# Patient Record
Sex: FEMALE | Race: BLACK OR AFRICAN AMERICAN | NOT HISPANIC OR LATINO | Employment: FULL TIME | URBAN - METROPOLITAN AREA
[De-identification: names, ages, dates, MRNs, and addresses within clinical notes are randomized per-mention and may not be internally consistent; named-entity substitution may affect disease eponyms.]

---

## 2018-09-28 ENCOUNTER — HISTORICAL (OUTPATIENT)
Dept: ADMINISTRATIVE | Facility: HOSPITAL | Age: 58
End: 2018-09-28

## 2018-09-28 LAB
ABS NEUT (OLG): 4
ALBUMIN SERPL-MCNC: 4.4 GM/DL (ref 3.4–5)
ALBUMIN/GLOB SERPL: 1.57 {RATIO} (ref 1.5–2.5)
ALP SERPL-CCNC: 46 UNIT/L (ref 38–126)
ALT SERPL-CCNC: 23 UNIT/L (ref 7–52)
AST SERPL-CCNC: 22 UNIT/L (ref 15–37)
BILIRUB SERPL-MCNC: 0.5 MG/DL (ref 0.2–1)
BILIRUBIN DIRECT+TOT PNL SERPL-MCNC: 0.1 MG/DL (ref 0–0.5)
BILIRUBIN DIRECT+TOT PNL SERPL-MCNC: 0.4 MG/DL
BUN SERPL-MCNC: 12 MG/DL (ref 7–18)
CALCIUM SERPL-MCNC: 9 MG/DL (ref 8.5–10)
CHLORIDE SERPL-SCNC: 106 MMOL/L (ref 98–107)
CHOLEST SERPL-MCNC: 269 MG/DL (ref 0–200)
CHOLEST/HDLC SERPL: 4.4 {RATIO}
CO2 SERPL-SCNC: 25 MMOL/L (ref 21–32)
CREAT SERPL-MCNC: 0.68 MG/DL (ref 0.6–1.3)
ERYTHROCYTE [DISTWIDTH] IN BLOOD BY AUTOMATED COUNT: 14.1 % (ref 11.5–17)
GLOBULIN SER-MCNC: 2.8 GM/DL (ref 1.2–3)
GLUCOSE SERPL-MCNC: 99 MG/DL (ref 74–106)
HCT VFR BLD AUTO: 41.9 % (ref 37–47)
HDLC SERPL-MCNC: 61 MG/DL (ref 35–60)
HGB BLD-MCNC: 13.5 GM/DL (ref 12–16)
LDLC SERPL CALC-MCNC: 177 MG/DL (ref 0–129)
LYMPHOCYTES # BLD AUTO: 1.1 X10(3)/MCL (ref 0.6–3.4)
LYMPHOCYTES NFR BLD AUTO: 20.5 % (ref 13–40)
MCH RBC QN AUTO: 32.8 PG (ref 27–31.2)
MCHC RBC AUTO-ENTMCNC: 32 GM/DL (ref 32–36)
MCV RBC AUTO: 102 FL (ref 80–94)
MONOCYTES # BLD AUTO: 0.4 X10(3)/MCL (ref 0–1.8)
MONOCYTES NFR BLD AUTO: 7.7 % (ref 0.1–24)
NEUTROPHILS NFR BLD AUTO: 71.8 % (ref 47–80)
PLATELET # BLD AUTO: 673 X10(3)/MCL (ref 130–400)
PMV BLD AUTO: 9 FL
POTASSIUM SERPL-SCNC: 4.1 MMOL/L (ref 3.5–5.1)
PROT SERPL-MCNC: 7.2 GM/DL (ref 6.4–8.2)
RBC # BLD AUTO: 4.12 X10(6)/MCL (ref 4.2–5.4)
SODIUM SERPL-SCNC: 143 MMOL/L (ref 136–145)
TRIGL SERPL-MCNC: 88 MG/DL (ref 30–150)
TSH SERPL-ACNC: 0.87 MIU/ML (ref 0.35–4.94)
VLDLC SERPL CALC-MCNC: 17.6 MG/DL
WBC # SPEC AUTO: 5.5 X10(3)/MCL (ref 4.5–11.5)

## 2019-10-18 ENCOUNTER — HISTORICAL (OUTPATIENT)
Dept: ADMINISTRATIVE | Facility: HOSPITAL | Age: 59
End: 2019-10-18

## 2019-10-18 LAB
ABS NEUT (OLG): 3.5 X10(3)/MCL (ref 2.1–9.2)
ALBUMIN SERPL-MCNC: 4.3 GM/DL (ref 3.4–5)
ALBUMIN/GLOB SERPL: 1.54 {RATIO} (ref 1.5–2.5)
ALP SERPL-CCNC: 47 UNIT/L (ref 38–126)
ALT SERPL-CCNC: 15 UNIT/L (ref 7–52)
AST SERPL-CCNC: 18 UNIT/L (ref 15–37)
BILIRUB SERPL-MCNC: 0.4 MG/DL (ref 0.2–1)
BILIRUBIN DIRECT+TOT PNL SERPL-MCNC: 0.1 MG/DL (ref 0–0.5)
BILIRUBIN DIRECT+TOT PNL SERPL-MCNC: 0.3 MG/DL
BUN SERPL-MCNC: 10 MG/DL (ref 7–18)
CALCIUM SERPL-MCNC: 9.4 MG/DL (ref 8.5–10)
CHLORIDE SERPL-SCNC: 106 MMOL/L (ref 98–107)
CHOLEST SERPL-MCNC: 260 MG/DL (ref 0–200)
CHOLEST/HDLC SERPL: 4.6 {RATIO}
CO2 SERPL-SCNC: 26 MMOL/L (ref 21–32)
CREAT SERPL-MCNC: 0.68 MG/DL (ref 0.6–1.3)
ERYTHROCYTE [DISTWIDTH] IN BLOOD BY AUTOMATED COUNT: 14 % (ref 11.5–17)
GLOBULIN SER-MCNC: 2.8 GM/DL (ref 1.2–3)
GLUCOSE SERPL-MCNC: 101 MG/DL (ref 74–106)
HCT VFR BLD AUTO: 39.4 % (ref 37–47)
HDLC SERPL-MCNC: 57 MG/DL (ref 35–60)
HGB BLD-MCNC: 13.4 GM/DL (ref 12–16)
LDLC SERPL CALC-MCNC: 166 MG/DL (ref 0–129)
LYMPHOCYTES # BLD AUTO: 1.3 X10(3)/MCL (ref 0.6–3.4)
LYMPHOCYTES NFR BLD AUTO: 24.4 % (ref 13–40)
MCH RBC QN AUTO: 32.5 PG (ref 27–31.2)
MCHC RBC AUTO-ENTMCNC: 34 GM/DL (ref 32–36)
MCV RBC AUTO: 96 FL (ref 80–94)
MONOCYTES # BLD AUTO: 0.5 X10(3)/MCL (ref 0.1–1.3)
MONOCYTES NFR BLD AUTO: 9.6 % (ref 0.1–24)
NEUTROPHILS NFR BLD AUTO: 66 % (ref 47–80)
PLATELET # BLD AUTO: 691 X10(3)/MCL (ref 130–400)
PMV BLD AUTO: 9.3 FL (ref 9.4–12.4)
POTASSIUM SERPL-SCNC: 4.2 MMOL/L (ref 3.5–5.1)
PROT SERPL-MCNC: 7.1 GM/DL (ref 6.4–8.2)
RBC # BLD AUTO: 4.12 X10(6)/MCL (ref 4.2–5.4)
SODIUM SERPL-SCNC: 141 MMOL/L (ref 136–145)
TRIGL SERPL-MCNC: 87 MG/DL (ref 30–150)
TSH SERPL-ACNC: 0.5 MIU/ML (ref 0.35–4.94)
VLDLC SERPL CALC-MCNC: 17.4 MG/DL
WBC # SPEC AUTO: 5.3 X10(3)/MCL (ref 4.5–11.5)

## 2020-05-07 ENCOUNTER — HISTORICAL (OUTPATIENT)
Dept: ADMINISTRATIVE | Facility: HOSPITAL | Age: 60
End: 2020-05-07

## 2020-05-07 LAB
ABS NEUT (OLG): 3.3 X10(3)/MCL (ref 2.1–9.2)
ALBUMIN SERPL-MCNC: 4.1 GM/DL (ref 3.4–5)
ALBUMIN/GLOB SERPL: 1.58 {RATIO} (ref 1.5–2.5)
ALP SERPL-CCNC: 54 UNIT/L (ref 38–126)
ALT SERPL-CCNC: 38 UNIT/L (ref 7–52)
APPEARANCE, UA: CLEAR
AST SERPL-CCNC: 33 UNIT/L (ref 15–37)
BACTERIA #/AREA URNS AUTO: NORMAL /HPF
BILIRUB SERPL-MCNC: 0.3 MG/DL (ref 0.2–1)
BILIRUB UR QL STRIP: NEGATIVE MG/DL
BILIRUBIN DIRECT+TOT PNL SERPL-MCNC: 0 MG/DL (ref 0–0.5)
BILIRUBIN DIRECT+TOT PNL SERPL-MCNC: 0.3 MG/DL
BUN SERPL-MCNC: 16 MG/DL (ref 7–18)
CALCIUM SERPL-MCNC: 9.3 MG/DL (ref 8.5–10)
CHLORIDE SERPL-SCNC: 107 MMOL/L (ref 98–107)
CHOLEST SERPL-MCNC: 249 MG/DL (ref 0–200)
CHOLEST/HDLC SERPL: 4.4 {RATIO}
CO2 SERPL-SCNC: 25 MMOL/L (ref 21–32)
COLOR UR: YELLOW
CREAT SERPL-MCNC: 0.71 MG/DL (ref 0.6–1.3)
ERYTHROCYTE [DISTWIDTH] IN BLOOD BY AUTOMATED COUNT: 14.5 % (ref 11.5–17)
GLOBULIN SER-MCNC: 2.6 GM/DL (ref 1.2–3)
GLUCOSE (UA): NEGATIVE MG/DL
GLUCOSE SERPL-MCNC: 94 MG/DL (ref 74–106)
HCT VFR BLD AUTO: 40.5 % (ref 37–47)
HDLC SERPL-MCNC: 56 MG/DL (ref 35–60)
HGB BLD-MCNC: 13.3 GM/DL (ref 12–16)
HGB UR QL STRIP: NEGATIVE UNIT/L
KETONES UR QL STRIP: NEGATIVE MG/DL
LDLC SERPL CALC-MCNC: 171 MG/DL (ref 0–129)
LEUKOCYTE ESTERASE UR QL STRIP: NEGATIVE UNIT/L
LYMPHOCYTES # BLD AUTO: 1.4 X10(3)/MCL (ref 0.6–3.4)
LYMPHOCYTES NFR BLD AUTO: 26.7 % (ref 13–40)
MCH RBC QN AUTO: 31.5 PG (ref 27–31.2)
MCHC RBC AUTO-ENTMCNC: 33 GM/DL (ref 32–36)
MCV RBC AUTO: 96 FL (ref 80–94)
MONOCYTES # BLD AUTO: 0.6 X10(3)/MCL (ref 0.1–1.3)
MONOCYTES NFR BLD AUTO: 10.7 % (ref 0.1–24)
NEUTROPHILS NFR BLD AUTO: 62.6 % (ref 47–80)
NITRITE UR QL STRIP.AUTO: NEGATIVE
PH UR STRIP: 6 [PH]
PLATELET # BLD AUTO: 804 X10(3)/MCL (ref 130–400)
PMV BLD AUTO: 9.3 FL (ref 9.4–12.4)
POTASSIUM SERPL-SCNC: 4.1 MMOL/L (ref 3.5–5.1)
PROT SERPL-MCNC: 6.7 GM/DL (ref 6.4–8.2)
PROT UR QL STRIP: NEGATIVE MG/DL
RBC # BLD AUTO: 4.22 X10(6)/MCL (ref 4.2–5.4)
RBC #/AREA URNS HPF: NORMAL /HPF
SODIUM SERPL-SCNC: 142 MMOL/L (ref 136–145)
SP GR UR STRIP: 1.02
SQUAMOUS EPITHELIAL, UA: NORMAL /LPF
TRIGL SERPL-MCNC: 99 MG/DL (ref 30–150)
UROBILINOGEN UR STRIP-ACNC: 0.2 MG/DL
VLDLC SERPL CALC-MCNC: 19.8 MG/DL
WBC # SPEC AUTO: 5.3 X10(3)/MCL (ref 4.5–11.5)
WBC #/AREA URNS AUTO: NORMAL /[HPF]

## 2020-12-11 ENCOUNTER — HISTORICAL (OUTPATIENT)
Dept: ADMINISTRATIVE | Facility: HOSPITAL | Age: 60
End: 2020-12-11

## 2020-12-11 LAB
ABS NEUT (OLG): 3.6 X10(3)/MCL (ref 2.1–9.2)
ALBUMIN SERPL-MCNC: 4.4 GM/DL (ref 3.4–5)
ALBUMIN/GLOB SERPL: 1.83 {RATIO} (ref 1.5–2.5)
ALP SERPL-CCNC: 58 UNIT/L (ref 38–126)
ALT SERPL-CCNC: 29 UNIT/L (ref 7–52)
AST SERPL-CCNC: 24 UNIT/L (ref 15–37)
BILIRUB SERPL-MCNC: 0.5 MG/DL (ref 0.2–1)
BILIRUBIN DIRECT+TOT PNL SERPL-MCNC: 0.1 MG/DL (ref 0–0.5)
BILIRUBIN DIRECT+TOT PNL SERPL-MCNC: 0.4 MG/DL
BUN SERPL-MCNC: 10 MG/DL (ref 7–18)
CALCIUM SERPL-MCNC: 9.6 MG/DL (ref 8.5–10)
CHLORIDE SERPL-SCNC: 106 MMOL/L (ref 98–107)
CHOLEST SERPL-MCNC: 173 MG/DL (ref 0–200)
CHOLEST/HDLC SERPL: 3.2 {RATIO}
CO2 SERPL-SCNC: 30 MMOL/L (ref 21–32)
CREAT SERPL-MCNC: 0.61 MG/DL (ref 0.6–1.3)
ERYTHROCYTE [DISTWIDTH] IN BLOOD BY AUTOMATED COUNT: 14.1 % (ref 11.5–17)
GLOBULIN SER-MCNC: 2.4 GM/DL (ref 1.2–3)
GLUCOSE SERPL-MCNC: 92 MG/DL (ref 74–106)
HCT VFR BLD AUTO: 41.3 % (ref 37–47)
HDLC SERPL-MCNC: 54 MG/DL (ref 35–60)
HGB BLD-MCNC: 13.6 GM/DL (ref 12–16)
LDLC SERPL CALC-MCNC: 108 MG/DL (ref 0–129)
LYMPHOCYTES # BLD AUTO: 1.4 X10(3)/MCL (ref 0.6–3.4)
LYMPHOCYTES NFR BLD AUTO: 26.6 % (ref 13–40)
MCH RBC QN AUTO: 31.9 PG (ref 27–31.2)
MCHC RBC AUTO-ENTMCNC: 33 GM/DL (ref 32–36)
MCV RBC AUTO: 97 FL (ref 80–94)
MONOCYTES # BLD AUTO: 0.4 X10(3)/MCL (ref 0.1–1.3)
MONOCYTES NFR BLD AUTO: 7.7 % (ref 0.1–24)
NEUTROPHILS NFR BLD AUTO: 65.7 % (ref 47–80)
PLATELET # BLD AUTO: 819 X10(3)/MCL (ref 130–400)
PMV BLD AUTO: 9.5 FL (ref 9.4–12.4)
POTASSIUM SERPL-SCNC: 4 MMOL/L (ref 3.5–5.1)
PROT SERPL-MCNC: 6.8 GM/DL (ref 6.4–8.2)
RBC # BLD AUTO: 4.26 X10(6)/MCL (ref 4.2–5.4)
SODIUM SERPL-SCNC: 143 MMOL/L (ref 136–145)
TRIGL SERPL-MCNC: 64 MG/DL (ref 30–150)
TSH SERPL-ACNC: 1.02 MIU/ML (ref 0.35–4.94)
VLDLC SERPL CALC-MCNC: 12.8 MG/DL
WBC # SPEC AUTO: 5.4 X10(3)/MCL (ref 4.5–11.5)

## 2021-09-15 ENCOUNTER — HISTORICAL (OUTPATIENT)
Dept: ADMINISTRATIVE | Facility: HOSPITAL | Age: 61
End: 2021-09-15

## 2021-09-15 LAB
ABS NEUT (OLG): 3.7 X10(3)/MCL (ref 2.1–9.2)
ALBUMIN SERPL-MCNC: 4.1 GM/DL (ref 3.4–5)
ALBUMIN/GLOB SERPL: 1.78 {RATIO} (ref 1.5–2.5)
ALP SERPL-CCNC: 50 UNIT/L (ref 38–126)
ALT SERPL-CCNC: 34 UNIT/L (ref 7–52)
APPEARANCE, UA: ABNORMAL
AST SERPL-CCNC: 26 UNIT/L (ref 15–37)
BACTERIA #/AREA URNS AUTO: ABNORMAL /HPF
BILIRUB SERPL-MCNC: 0.5 MG/DL (ref 0.2–1)
BILIRUB UR QL STRIP: NEGATIVE MG/DL
BILIRUBIN DIRECT+TOT PNL SERPL-MCNC: 0.1 MG/DL (ref 0–0.5)
BILIRUBIN DIRECT+TOT PNL SERPL-MCNC: 0.4 MG/DL
BUN SERPL-MCNC: 11 MG/DL (ref 7–18)
CALCIUM SERPL-MCNC: 9.1 MG/DL (ref 8.5–10)
CHLORIDE SERPL-SCNC: 106 MMOL/L (ref 98–107)
CHOLEST SERPL-MCNC: 195 MG/DL (ref 0–200)
CHOLEST/HDLC SERPL: 3.5 {RATIO}
CO2 SERPL-SCNC: 29 MMOL/L (ref 21–32)
COLOR UR: YELLOW
CREAT SERPL-MCNC: 0.66 MG/DL (ref 0.6–1.3)
ERYTHROCYTE [DISTWIDTH] IN BLOOD BY AUTOMATED COUNT: 13.9 % (ref 11.5–17)
GLOBULIN SER-MCNC: 2.3 GM/DL (ref 1.2–3)
GLUCOSE (UA): NEGATIVE MG/DL
GLUCOSE SERPL-MCNC: 88 MG/DL (ref 74–106)
HCT VFR BLD AUTO: 40.1 % (ref 37–47)
HDLC SERPL-MCNC: 55 MG/DL (ref 35–60)
HGB BLD-MCNC: 13.1 GM/DL (ref 12–16)
HGB UR QL STRIP: NEGATIVE UNIT/L
KETONES UR QL STRIP: NEGATIVE MG/DL
LDLC SERPL CALC-MCNC: 111 MG/DL (ref 0–129)
LEUKOCYTE ESTERASE UR QL STRIP: ABNORMAL UNIT/L
LYMPHOCYTES # BLD AUTO: 1.4 X10(3)/MCL (ref 0.6–3.4)
LYMPHOCYTES NFR BLD AUTO: 25.4 % (ref 13–40)
MCH RBC QN AUTO: 32 PG (ref 27–31.2)
MCHC RBC AUTO-ENTMCNC: 33 GM/DL (ref 32–36)
MCV RBC AUTO: 98 FL (ref 80–94)
MONOCYTES # BLD AUTO: 0.5 X10(3)/MCL (ref 0.1–1.3)
MONOCYTES NFR BLD AUTO: 8.7 % (ref 0.1–24)
NEUTROPHILS NFR BLD AUTO: 65.9 % (ref 47–80)
NITRITE UR QL STRIP.AUTO: NEGATIVE
PH UR STRIP: 7 [PH]
PLATELET # BLD AUTO: 732 X10(3)/MCL (ref 130–400)
PMV BLD AUTO: 9.1 FL (ref 9.4–12.4)
POTASSIUM SERPL-SCNC: 3.7 MMOL/L (ref 3.5–5.1)
PROT SERPL-MCNC: 6.4 GM/DL (ref 6.4–8.2)
PROT UR QL STRIP: NEGATIVE MG/DL
RBC # BLD AUTO: 4.09 X10(6)/MCL (ref 4.2–5.4)
RBC #/AREA URNS HPF: ABNORMAL /HPF
SODIUM SERPL-SCNC: 143 MMOL/L (ref 136–145)
SP GR UR STRIP: 1.02
SQUAMOUS EPITHELIAL, UA: ABNORMAL /LPF
TRIGL SERPL-MCNC: 83 MG/DL (ref 30–150)
UROBILINOGEN UR STRIP-ACNC: 0.2 MG/DL
VLDLC SERPL CALC-MCNC: 16.6 MG/DL
WBC # SPEC AUTO: 5.6 X10(3)/MCL (ref 4.5–11.5)
WBC #/AREA URNS AUTO: ABNORMAL /[HPF]

## 2021-09-17 LAB — FINAL CULTURE: NORMAL

## 2022-04-11 ENCOUNTER — HISTORICAL (OUTPATIENT)
Dept: ADMINISTRATIVE | Facility: HOSPITAL | Age: 62
End: 2022-04-11

## 2022-04-27 VITALS
BODY MASS INDEX: 27.47 KG/M2 | DIASTOLIC BLOOD PRESSURE: 82 MMHG | HEIGHT: 71 IN | SYSTOLIC BLOOD PRESSURE: 128 MMHG | WEIGHT: 196.19 LBS

## 2022-09-23 PROBLEM — D47.3 ESSENTIAL THROMBOCYTHEMIA: Status: RESOLVED | Noted: 2022-09-23 | Resolved: 2022-09-23

## 2022-09-23 PROBLEM — F41.1 GENERALIZED ANXIETY DISORDER: Status: ACTIVE | Noted: 2022-09-23

## 2022-09-23 PROBLEM — E78.5 HYPERLIPIDEMIA: Status: ACTIVE | Noted: 2022-09-23

## 2022-09-23 PROBLEM — D47.3 ESSENTIAL THROMBOCYTHEMIA: Status: ACTIVE | Noted: 2022-09-23

## 2022-09-23 PROBLEM — I10 BENIGN ESSENTIAL HYPERTENSION: Status: ACTIVE | Noted: 2022-09-23

## 2022-09-23 PROBLEM — D75.839 THROMBOCYTOSIS: Status: ACTIVE | Noted: 2022-09-23

## 2022-12-02 ENCOUNTER — HOSPITAL ENCOUNTER (OUTPATIENT)
Dept: RADIOLOGY | Facility: HOSPITAL | Age: 62
Discharge: HOME | End: 2022-12-02
Attending: NURSE PRACTITIONER
Payer: COMMERCIAL

## 2022-12-02 DIAGNOSIS — Z12.2 ENCOUNTER FOR SCREENING FOR LUNG CANCER: ICD-10-CM

## 2022-12-02 DIAGNOSIS — Z72.0 TOBACCO USE: ICD-10-CM

## 2022-12-02 PROCEDURE — 71271 CT THORAX LUNG CANCER SCR C-: CPT | Mod: TC

## 2023-07-27 PROBLEM — Z00.00 WELLNESS EXAMINATION: Status: ACTIVE | Noted: 2023-07-27

## 2023-10-30 PROBLEM — Z00.00 WELLNESS EXAMINATION: Status: RESOLVED | Noted: 2023-07-27 | Resolved: 2023-10-30

## 2024-06-04 ENCOUNTER — TELEPHONE (OUTPATIENT)
Dept: NEUROSURGERY | Facility: CLINIC | Age: 64
End: 2024-06-04
Payer: MEDICAID

## 2024-06-04 NOTE — TELEPHONE ENCOUNTER
PATIENT HAS BEEN CALLED APPOINTMENT HAS BEEN SCHEDULED WITH DR. TRISTEN ALMAGUER. PATIENT WOULD LIKE TO BE SEEN CLOSER TO [place] IF POSSIBLE. PATIENT WAS ADVISED THAT THERE ARE NO NEUROSURGERY IN THE [facility] IN [facility]. PATIENT VERBALIZED CLEAR UNDERSTANDING, AND WILL KEEP HER APPOINTMENT AT [facility]. PATIENT WAS ADVISED TO GET ALL HER MRI'S ON A DISC TO BRING TO HER SCHEDULED APPOINTMENT.

## 2024-06-04 NOTE — TELEPHONE ENCOUNTER
----- Message from Delfina Thakkar sent at 6/3/2024  9:44 AM CDT -----  Regarding: appt access  Contact: [phone]  Pt is calling to speak with someone in provider office in regards to scheduling an appt with a provider in this dept pt has a referral in [facility] pt stated she has  a brain tumor pt  is asking for a return call please call pt at  [phone]

## 2024-06-06 DIAGNOSIS — R90.0 INTRACRANIAL MASS: Primary | ICD-10-CM

## 2024-06-07 ENCOUNTER — TELEPHONE (OUTPATIENT)
Dept: NEUROSURGERY | Facility: CLINIC | Age: 64
End: 2024-06-07
Payer: MEDICAID

## 2024-06-07 NOTE — TELEPHONE ENCOUNTER
64 year old female patient is being referred to Dr Gutierrze by Norma Arce NP at [facility] on 6/6/24 for an intercranial mass after Telehealth f/u visit on 4/2/24 for sinus symptoms for 3/4 days     Telephone Note in chart 6/4/24 states:   PATIENT HAS BEEN CALLED APPOINTMENT HAS BEEN SCHEDULED WITH DR. TRISTEN ALMAGUER. PATIENT WOULD LIKE TO BE SEEN CLOSER TO [place] IF POSSIBLE. PATIENT WAS ADVISED THAT THERE ARE NO NEUROSURGERY IN THE [facility] IN [facility]. PATIENT VERBALIZED CLEAR UNDERSTANDING, AND WILL KEEP HER APPOINTMENT AT [facility]. PATIENT WAS ADVISED TO GET ALL HER MRI'S ON A DISC TO BRING TO HER SCHEDULED APPOINTMENT.     **Appointment is scheduled for 6/18/24       MRI Brain 5/29/24 (report in chart - requested images from [facility] 6/7) findings state:   Mass Lesion / Mass Effect:  1.8 x 2.5 cm right anterior temporal fossa meningioma is again present.     Similar right temporal, insular and basal ganglia confluent increased FLAIR signal compatible with vasogenic edema. Similar mild mass effect upon the right lateral ventricle with 2 mm of left midline shift. No basilar cistern effacement. The mass abuts the MCA M1 and M2 bifurcation. No additional lesions are present.     Cortex/White matter: Scattered foci of FLAIR/T2 signal within the subcortical and periventricular white matter is nonspecific, but likely related to mild chronic small vessel ischemic disease. Similar periatrial subcortical white matter gliosis.     *COMPARISON: CT 5/29/2024. MR brain 5/8/2024. MR brain 4/11/2024. CT 4/11/2024 - reports in chart - requested images from [facility] 6/7    [facility] Neurology consult note from ED 5/29/24 states:   Neurosurgery is recommended 7 days of Decadron.  They have given the ER the dose.     I have suggested continuing the Keppra 500 mg twice a day.  There is nothing that I get historically to suggest a seizure.  What she is describing is anxiety.  I do not  believe that these are auras of temporal lobe seizure although I cannot be certain.  Routine EEG was unremarkable although the official report is pending.  I have suggested outpatient neurology.  I am not at all certain that the original spell was a seizure.  It is very difficult to tell from the old chart but while I see is a fall leading to hospitalization after imaging showed a mass.  Keppra was only started for seizure prophylaxis.  Based on what I am hearing I cannot justify increasing the Keppra and when I did suggest that she was not in favor of it.  When I did discuss the potential need for an alternative anticonvulsant with a different side effect profile she was reluctant to have that discussion.  But again many would not put patients on anticonvulsants prophylactically in the situation instead waiting to see if they do indeed have seizure activity.  That is not my approach.  I fully support the plans and management of my colleagues that have taken place.  I am not at all clear that she has had any emotional or psychiatric complications of Keppra but reviewed them at length and urged her to return to the ER immediately if she thought what was the case.  Urged to return immediately if she had any seizure activity and she is quite clear that she has not had a yanna ictal event with loss of contact with her surroundings or a clear suggestion of focal neurologic dysfunction as aura.     I have spoken with nursing at length about making sure she has outpatient neurology and neurosurgery follow-ups.     I have given the patient my cell phone number in case things are falling through the cracks.     Neurosurgery saw her and spoke to me at length and they do not feel this is an emergent issue that justifies hospitalization.  Again the patient's never had a seizure that I can be certain of.  Keppra was started as seizure prophylaxis.     Hopefully outpatient follow-ups will occur.     Again I given the patient my  cell phone number in case things do not follow through.      **I believe Magdalena may have spoken to you re this referral yesterday   Please review and advise as to how to proceed. Thank you

## 2024-06-07 NOTE — TELEPHONE ENCOUNTER
See should be seen soon.  Try to work in next week with either BARBARA, Matt, or Shamar.   Bilateral Helical Rim Advancement Flap Text: The defect edges were debeveled with a #15 blade scalpel.  Given the location of the defect and the proximity to free margins (helical rim) a bilateral helical rim advancement flap was deemed most appropriate.  Using a sterile surgical marker, the appropriate advancement flaps were drawn incorporating the defect and placing the expected incisions between the helical rim and antihelix where possible.  The area thus outlined was incised through and through with a #15 scalpel blade.  With a skin hook and iris scissors, the flaps were gently and sharply undermined and freed up.

## 2024-06-11 ENCOUNTER — TELEPHONE (OUTPATIENT)
Dept: NEUROSURGERY | Facility: CLINIC | Age: 64
End: 2024-06-11
Payer: MEDICAID

## 2024-06-11 NOTE — TELEPHONE ENCOUNTER
Attempted to call back pt with both numbers on file 2x. No answer so VM left with callback number if still having questions.   ----- Message from Haleigh Stewart sent at 6/11/2024 10:31 AM CDT -----  Regarding: Pt Advice  Contact: [phone]  Pt calling to speak with someone in provider office regarding appt on 6/18. States she would like to know will she be scheduled for surgery on 6/18. Please call pt back at  [phone]

## 2024-06-18 ENCOUNTER — OFFICE VISIT (OUTPATIENT)
Dept: NEUROSURGERY | Facility: CLINIC | Age: 64
End: 2024-06-18
Payer: MEDICAID

## 2024-06-18 VITALS — HEART RATE: 108 BPM | DIASTOLIC BLOOD PRESSURE: 99 MMHG | SYSTOLIC BLOOD PRESSURE: 137 MMHG

## 2024-06-18 DIAGNOSIS — D32.9 MENINGIOMA: Primary | ICD-10-CM

## 2024-06-18 PROCEDURE — 3075F SYST BP GE 130 - 139MM HG: CPT | Mod: CPTII,,, | Performed by: NEUROLOGICAL SURGERY

## 2024-06-18 PROCEDURE — 1159F MED LIST DOCD IN RCRD: CPT | Mod: CPTII,,, | Performed by: NEUROLOGICAL SURGERY

## 2024-06-18 PROCEDURE — 99205 OFFICE O/P NEW HI 60 MIN: CPT | Mod: S$PBB,,, | Performed by: NEUROLOGICAL SURGERY

## 2024-06-18 PROCEDURE — 99999 PR PBB SHADOW E&M-EST. PATIENT-LVL IV: CPT | Mod: PBBFAC,,, | Performed by: NEUROLOGICAL SURGERY

## 2024-06-18 PROCEDURE — 99214 OFFICE O/P EST MOD 30 MIN: CPT | Mod: PBBFAC | Performed by: NEUROLOGICAL SURGERY

## 2024-06-18 PROCEDURE — 3080F DIAST BP >= 90 MM HG: CPT | Mod: CPTII,,, | Performed by: NEUROLOGICAL SURGERY

## 2024-06-18 PROCEDURE — 1160F RVW MEDS BY RX/DR IN RCRD: CPT | Mod: CPTII,,, | Performed by: NEUROLOGICAL SURGERY

## 2024-06-18 RX ORDER — LEVETIRACETAM 500 MG/1
TABLET ORAL
COMMUNITY

## 2024-06-18 RX ORDER — DEXAMETHASONE 2 MG/1
2 TABLET ORAL EVERY 12 HOURS
Qty: 28 TABLET | Refills: 0 | Status: SHIPPED | OUTPATIENT
Start: 2024-06-18 | End: 2024-07-02

## 2024-06-18 NOTE — H&P (VIEW-ONLY)
CHIEF COMPLAINT:  Meningioma    HPI:  Meghana Colón is a 64 y.o.  female with below PMH, who is referred for evaluation of right middle fossa/temporal extradural mass.  MRI was obtained after an episode where she was found on the floor in which she hit her head.  There was a question that this was related to seizure activity.  She was placed on Keppra and takes it as directed.  She is quite anxious about this mass as several of her family members have  due to brain metastases in the past.  She is eager to have surgery to remove it.    She reports right hand numbness, tremors, headaches, weakness on her left side more than her right.  She also reports that her speech is off but thinks it could be due to her anxiety.    She was seen in the emergency department where she was placed on Decadron which she completed on .  She still has headaches on the right sides since stopping the Decadron.      She also has a history of central thrombocytopenia for which he was taking aspirin but has since stopped due to the recent fall.    Review of patient's allergies indicates:   Allergen Reactions    Penicillin      Other reaction(s): Unknown       History reviewed. No pertinent past medical history.  Past Surgical History:   Procedure Laterality Date    BONE MARROW BIOPSY      BREAST BIOPSY      COLONOSCOPY  2016    HYSTERECTOMY      THERAPEUTIC        Family History   Problem Relation Name Age of Onset    Cancer Mother      Alzheimer's disease Mother      Heart disease Mother      Cancer Father      Hypertension Sister      Diabetes Sister      Cancer Sister          breast    Cancer Brother      Hypertension Brother      Heart disease Brother      Heart attack Brother       Social History     Tobacco Use    Smoking status: Some Days     Types: Cigarettes    Smokeless tobacco: Never        Review of Systems   Constitutional: Negative.    HENT:  Negative for congestion, ear discharge, ear pain, hearing  loss, nosebleeds, sinus pain and tinnitus.    Eyes: Negative.    Respiratory: Negative.     Cardiovascular:  Negative for chest pain, palpitations, claudication and leg swelling.   Gastrointestinal:  Negative for abdominal pain, blood in stool, constipation, diarrhea, melena and vomiting.   Genitourinary:  Negative for flank pain, frequency and urgency.   Musculoskeletal:  Negative for falls.   Skin: Negative.    Neurological:  Positive for tremors, sensory change, seizures and loss of consciousness. Negative for dizziness, tingling, speech change, focal weakness, weakness and headaches.   Endo/Heme/Allergies:  Does not bruise/bleed easily.   Psychiatric/Behavioral:  Negative for depression, hallucinations, memory loss, substance abuse and suicidal ideas. The patient is nervous/anxious. The patient does not have insomnia.        OBJECTIVE:   Vital Signs:  Pulse: 108 (06/18/24 1427)  BP: (!) 137/99 (06/18/24 1427)    Physical Exam:  Constitutional: Patient sitting comfortably in chair. Appears well developed and well nourished.  Skin: Exposed areas are intact without abnormal markings, rashes or other lesions.  HEENT: Normocephalic. Normal conjunctivae.  Cardiovascular: Normal rate and regular rhythm.  Respiratory: Chest wall rises and falls symmetrically, without signs of respiratory distress.  Abdomen: Soft and non-tender.  Extremities: Warm and without edema. Calves supple, non-tender.  Psych/Behavior: Normal affect.    Neurological:    Mental status: Alert and oriented. Conversational and appropriate.       Cranial Nerves: VFF to confrontation. PERRL. EOMI without nystagmus. Facial STLT normal and symmetric. Strong, symmetric muscles of mastication. Facial strength full and symmetric. Hearing equal bilaterally to finger rub. Palate and uvula rise and fall normally in midline. Shoulder shrug 5/5 strength. Tongue midline.     Motor:    Upper:  Deltoids Triceps Biceps WE WF     R 5/5 5/5 5/5 5/5 5/5 5/5    L 5/5  5/5 5/5 5/5 5/5 5/5      Lower:  HF KE KF DF PF EHL    R 5/5 5/5 5/5 5/5 5/5 5/5    L 5/5 5/5 5/5 5/5 5/5 5/5     Sensory: Intact sensation to light touch in all extremities. Romberg negative.    Reflexes:          DTR: 2+ symmetrically throughout.     Romero's: Negative.     Babinski's: Negative.     Clonus: Negative.    Cerebellar: Finger-to-nose and rapid alternating movements normal.     Gait stable    Diagnostic Results:  All imaging was independently reviewed by me.    Outside MRI brain, dated 5/29/24:  1. Right lateral sphenoid extradural mass with compression on the left temporal lobe causing vasogenic edema      ASSESSMENT/PLAN:     Problem List Items Addressed This Visit          Oncology    Meningioma - Primary    Relevant Orders    MRI BRAIN STEALTH W/O FIDUCIALS WITH CONTRAST    Case Request Operating Room: CRANIOTOMY, WITH NEOPLASM EXCISION USING COMPUTER-ASSISTED NAVIGATION (Completed)       Patient was found to have a right lateral sphenoid extradural mass most likely a meningioma.  She recently had an episode of falling/loss of consciousness that could be related to seizure activity (although unwitnessed). She is anxious about the tumor and would like to have it removed.  She is currently stable on Keppra and completed a course of Decadron.  I recommend she continue taking the Keppra and will restart her on Decadron 2 mg b.i.d. until surgery.  Given her history of central thrombocytopenia, I would like her evaluated by Dr. Dorsey prior to surgery.    - Surgery scheduled for 7/1/24 at [facility]  - Preop clearance needed from Dr Dorsey  - STOP TAKING ASPIRIN, NSAIDS, AND ALL OTHER BLOOD THINNERS 7 DAYS PRIOR TO SURGERY  - Ordered decadron 2mg BID  - Cont keppra  - Ordered new Brain MRI stealth with contrast (This was necessary because outside MRI scan unable to be properly loaded into our neuronavigation system.  Neuronavigation is essential to safely performing surgery)    The patient understands  and agrees with the plan of care. All questions were answered.            .

## 2024-06-18 NOTE — PROGRESS NOTES
CHIEF COMPLAINT:  Meningioma    HPI:  Meghana Colón is a 64 y.o.  female with below PMH, who is referred for evaluation of right middle fossa/temporal extradural mass.  MRI was obtained after an episode where she was found on the floor in which she hit her head.  There was a question that this was related to seizure activity.  She was placed on Keppra and takes it as directed.  She is quite anxious about this mass as several of her family members have  due to brain metastases in the past.  She is eager to have surgery to remove it.    She reports right hand numbness, tremors, headaches, weakness on her left side more than her right.  She also reports that her speech is off but thinks it could be due to her anxiety.    She was seen in the emergency department where she was placed on Decadron which she completed on .  She still has headaches on the right sides since stopping the Decadron.      She also has a history of central thrombocytopenia for which he was taking aspirin but has since stopped due to the recent fall.    Review of patient's allergies indicates:   Allergen Reactions    Penicillin      Other reaction(s): Unknown       History reviewed. No pertinent past medical history.  Past Surgical History:   Procedure Laterality Date    BONE MARROW BIOPSY      BREAST BIOPSY      COLONOSCOPY  2016    HYSTERECTOMY      THERAPEUTIC        Family History   Problem Relation Name Age of Onset    Cancer Mother      Alzheimer's disease Mother      Heart disease Mother      Cancer Father      Hypertension Sister      Diabetes Sister      Cancer Sister          breast    Cancer Brother      Hypertension Brother      Heart disease Brother      Heart attack Brother       Social History     Tobacco Use    Smoking status: Some Days     Types: Cigarettes    Smokeless tobacco: Never        Review of Systems   Constitutional: Negative.    HENT:  Negative for congestion, ear discharge, ear pain, hearing  loss, nosebleeds, sinus pain and tinnitus.    Eyes: Negative.    Respiratory: Negative.     Cardiovascular:  Negative for chest pain, palpitations, claudication and leg swelling.   Gastrointestinal:  Negative for abdominal pain, blood in stool, constipation, diarrhea, melena and vomiting.   Genitourinary:  Negative for flank pain, frequency and urgency.   Musculoskeletal:  Negative for falls.   Skin: Negative.    Neurological:  Positive for tremors, sensory change, seizures and loss of consciousness. Negative for dizziness, tingling, speech change, focal weakness, weakness and headaches.   Endo/Heme/Allergies:  Does not bruise/bleed easily.   Psychiatric/Behavioral:  Negative for depression, hallucinations, memory loss, substance abuse and suicidal ideas. The patient is nervous/anxious. The patient does not have insomnia.        OBJECTIVE:   Vital Signs:  Pulse: 108 (06/18/24 1427)  BP: (!) 137/99 (06/18/24 1427)    Physical Exam:  Constitutional: Patient sitting comfortably in chair. Appears well developed and well nourished.  Skin: Exposed areas are intact without abnormal markings, rashes or other lesions.  HEENT: Normocephalic. Normal conjunctivae.  Cardiovascular: Normal rate and regular rhythm.  Respiratory: Chest wall rises and falls symmetrically, without signs of respiratory distress.  Abdomen: Soft and non-tender.  Extremities: Warm and without edema. Calves supple, non-tender.  Psych/Behavior: Normal affect.    Neurological:    Mental status: Alert and oriented. Conversational and appropriate.       Cranial Nerves: VFF to confrontation. PERRL. EOMI without nystagmus. Facial STLT normal and symmetric. Strong, symmetric muscles of mastication. Facial strength full and symmetric. Hearing equal bilaterally to finger rub. Palate and uvula rise and fall normally in midline. Shoulder shrug 5/5 strength. Tongue midline.     Motor:    Upper:  Deltoids Triceps Biceps WE WF     R 5/5 5/5 5/5 5/5 5/5 5/5    L 5/5  5/5 5/5 5/5 5/5 5/5      Lower:  HF KE KF DF PF EHL    R 5/5 5/5 5/5 5/5 5/5 5/5    L 5/5 5/5 5/5 5/5 5/5 5/5     Sensory: Intact sensation to light touch in all extremities. Romberg negative.    Reflexes:          DTR: 2+ symmetrically throughout.     Romero's: Negative.     Babinski's: Negative.     Clonus: Negative.    Cerebellar: Finger-to-nose and rapid alternating movements normal.     Gait stable    Diagnostic Results:  All imaging was independently reviewed by me.    Outside MRI brain, dated 5/29/24:  1. Right lateral sphenoid extradural mass with compression on the left temporal lobe causing vasogenic edema      ASSESSMENT/PLAN:     Problem List Items Addressed This Visit          Oncology    Meningioma - Primary    Relevant Orders    MRI BRAIN STEALTH W/O FIDUCIALS WITH CONTRAST    Case Request Operating Room: CRANIOTOMY, WITH NEOPLASM EXCISION USING COMPUTER-ASSISTED NAVIGATION (Completed)       Patient was found to have a right lateral sphenoid extradural mass most likely a meningioma.  She recently had an episode of falling/loss of consciousness that could be related to seizure activity (although unwitnessed). She is anxious about the tumor and would like to have it removed.  She is currently stable on Keppra and completed a course of Decadron.  I recommend she continue taking the Keppra and will restart her on Decadron 2 mg b.i.d. until surgery.  Given her history of central thrombocytopenia, I would like her evaluated by Dr. Dorsey prior to surgery.    - Surgery scheduled for 7/1/24 at [facility]  - Preop clearance needed from Dr Dorsey  - STOP TAKING ASPIRIN, NSAIDS, AND ALL OTHER BLOOD THINNERS 7 DAYS PRIOR TO SURGERY  - Ordered decadron 2mg BID  - Cont keppra    The patient understands and agrees with the plan of care. All questions were answered.            .

## 2024-06-20 ENCOUNTER — TELEPHONE (OUTPATIENT)
Dept: PREADMISSION TESTING | Facility: HOSPITAL | Age: 64
End: 2024-06-20
Payer: MEDICAID

## 2024-06-20 DIAGNOSIS — Z01.818 PREOPERATIVE TESTING: Primary | ICD-10-CM

## 2024-06-20 NOTE — TELEPHONE ENCOUNTER
----- Message from Yvonne Terry RN sent at 6/20/2024  8:56 AM CDT -----  Surgery 7/1  Please schedule Dr Dorsey per Dr. Marie., poc, t&s, and EKG.  She asked to be called  on cell [phone].IF not jasper to get then try other numbers.  Thanks!

## 2024-06-20 NOTE — PRE-PROCEDURE INSTRUCTIONS
Patient stated has not had any problem with anesthesia in the past.  Will need medical optimization by  per Dr. Marie. Will need poc appt, lab and EKG.  Our  will call to set up these appts. She said her last dose of ASA was on 4/11.     Preop instructions given. Hold aspirin, aspirin containing products, nsaids(aleve, advil, motrin, ibuprofen, naprosyn, naproxen, voltaren, diclofenac), vitamins( Ergocalciferol, Vitamin D) and supplements one week prior to surgery.  May take Tylenol.( Also mailed to patient)    Verbalizes understanding.

## 2024-06-20 NOTE — TELEPHONE ENCOUNTER
----- Message from Rylie Contreras RN sent at 6/20/2024 10:56 AM CDT -----  7/1-Please schedule Aidan/POC

## 2024-06-20 NOTE — ANESTHESIA PAT ROS NOTE
2024  Meghana Colón is a 64 y.o., female who is scheduled for Craniotomy r/t Neoplasm, arrives for anesthesia assessment and preop instructions.      Pre-op Assessment    I have reviewed the Patient Summary Reports.     I have reviewed the Nursing Notes. I have reviewed the NPO Status.   I have reviewed the Medications.     Review of Systems  Anesthesia Hx:  No problems with previous Anesthesia               Denies Personal Hx of Anesthesia complications.                    Social:  Former Smoker, No Alcohol Use Quit smoking 8 months prior    Family History      Mother Cancer   Alzheimer's disease   Heart disease  Father Cancer  Sister Hypertension   Diabetes   Cancer   Brother Cancer   Hypertension   Heart disease   Heart attack    Many family members have  due to brain metastasis      Hematology/Oncology:       -- Anemia:               Hematology Comments: THROMBOCYTOPENIA                Oncology Comments: Date Unknown  Bone marrow biopsy    MENINGIOMA     Cardiovascular:  Exercise tolerance: good   Denies Pacemaker. Hypertension, well controlled       Denies Angina.    denies PVD hyperlipidemia  Denies BARRAZA.    Functional Capacity 4 METS                         Pulmonary:    Denies COPD.  Denies Asthma.   Denies Shortness of breath.  Denies Recent URI.  Denies Sleep Apnea.                Renal/:   Denies Chronic Renal Disease. no renal calculi  Resolved uti             Hepatic/GI:   Denies PUD.  GERD, well controlled Denies Liver Disease.  Denies Hepatitis. 2016  Colonoscopy            Musculoskeletal:   Musculoskeletal General/Symptoms:  Functional capacity is ambulatory without assistance. Occasional pain           OB/GYN/PEDS:    Date Unknown  Breast biopsy  Date Unknown  Hysterectomy  Date Unknown  Therapeutic              Neurological:           Brain Tumor, Meningioma     2024 1st and only seizure              Endocrine:  Denies Diabetes. Denies Hypothyroidism.  Denies  Hyperthyroidism.         Psych:  Psychiatric History anxiety  insomnia               Physical Exam  General: Well nourished, Cooperative, Alert and Oriented    Airway:  TM Distance: > 6 cm  Tongue: Normal  Neck ROM: Normal ROM    Dental:  Intact          Anesthesia Assessment: Preoperative EQUATION    Planned Procedure: Procedure(s) (LRB):  CRANIOTOMY, WITH NEOPLASM EXCISION USING COMPUTER-ASSISTED NAVIGATION (Right)  Requested Anesthesia Type:General  Surgeon: Terry Marie DO  Service: Neurosurgery  Known or anticipated Date of Surgery:7/1/2024    Surgeon notes: reviewed    Electronic QUestionnaire Assessment completed via nurse interview with patient.        Triage considerations:       Previous anesthesia records:No problems and Not available    Last PCP note: outside [facility]   Subspecialty notes: Neurosurgery    Other important co-morbidities:PER EPIC:  HLD, HTN, Smoker, and MEINGIOMA       Tests already available:  Available tests,  within 3 months , within [facility] .   5/29/2024 UA, CMP, CBC, MRI BRAIN W/O CONTRAST, CT HEAD W/O CONTRAST, 4/11/20245 PT/INR          Instructions given. (See in Nurse's note)    Optimization:  Anesthesia Preop Clinic Assessment  Indicated    Medical Opinion Indicated          Plan:    Testing:  T&S & EKG   Pre-anesthesia  visit       Visit focus: concerns in complex and/or prolonged anesthesia, acute or chronic anxiety concerns     Consultation:IM Perioperative Hospitalist, DR YOUSSEF PER DR MARIE     Patient  has previously scheduled Medical Appointment:NONE    Navigation: Tests Scheduled. TBD             Consults scheduled.TBD             Results will be tracked by Preop Clinic  Yvonne Terry RN BSN            6/24/24 MEDICAL CLEARANCE  Patient is optimized    Patient/ care giver/ Family member was instructed to call and update me about any changes to health,  medication, office visits ,testing out side of the [facility] , hospitalizations between now and  surgery      Cheri Dorsey MD  Internal Medicine  [facility]   Cell Phone- [phone]

## 2024-06-21 NOTE — DISCHARGE INSTRUCTIONS
Your surgery has been scheduled for:_____7/1/24_____________________________________    You should report to:  ____[facility], located on the [place] side of the first floor of the           [facility] ([phone])  _X___[facility], located on the [facility] side of the            Second floor of the [facility] ([phone])  ____3rd Floor [facility] located on the [facility] side of the [facility] [phone]  ____[facility]: located at [address] [facility] [place] [address]. [address]     Please Note   Tell your doctor if you take Aspirin, products containing Aspirin, herbal medications  or blood thinners, such as Coumadin, Ticlid, or Plavix.  (Consult your provider regarding holding or stopping before surgery).  Arrange for someone to drive you home following surgery.  You will not be allowed to leave the surgical facility alone or drive yourself home following sedation and anesthesia.          Before Surgery  Stop taking all herbal medications, vitamins, and supplements 7 days prior to surgery  No Motrin/Advil (Ibuprofen) 7 days before surgery  No Aleve (Naproxen) 7 days before surgery  Stop Taking Asprin, products containing Asprin __7___days before surgery  Stop taking blood thinners_______days before surgery  No Goody's/BC  Powder 7 days before surgery  Refrain from drinking alcoholic beverages for 24hours before and after surgery  Stop or limit smoking _____7____days before surgery  You may take Tylenol for pain    Night before Surgery  Do not eat or drink after midnight  Take a shower or bath (shower is recommended).  Bathe with Hibiclens soap or an antibacterial soap from the neck down.  If not supplied by your surgeon, hibiclens soap will need to be purchased over the counter in pharmacy.  Rinse soap off thoroughly.  Shampoo your hair with your regular shampoo    The Day of  Surgery  Take another bath or shower with hibiclens or any antibacterial soap, to reduce the chance of infection.  Take heart and blood pressure medications with a small sip of water, as advised by the perioperative team.  Do not take fluid pills  You may brush your teeth and rinse your mouth, but do not swall any additional water.   Do not apply perfumes, powder, body lotions or deodorant on the day of surgery.  Nail polish should be removed.  Do not wear makeup or moisturizer  Wear comfortable clothes, such as a button front shirt and loose fitting pants.  Leave all jewelry, including body piercings, and valuables at home.    Bring any devices you will neeed after surgery such as crutches or canes.  If you have sleep apnea, please bring your CPAP machine  In the event that your physical condition changes including the onset of a cold or respiratory illness, or if you have to delay or cancel your surgery, please notify your surgeon.     Anesthesia: General Anesthesia     You are watched continuously during your procedure by your anesthesia provider.     Youre due to have surgery. During surgery, youll be given medicine called anesthesia or anesthetic. This will keep you comfortable and pain-free. Your anesthesia provider will use general anesthesia.  What is general anesthesia?  General anesthesia puts you into a state like deep sleep. It goes into the bloodstream (IV anesthetics), into the lungs (gas anesthetics), or both. You feel nothing during the procedure. You will not remember it. During the procedure, the anesthesia provider monitors you continuously. He or she checks your heart rate and rhythm, blood pressure, breathing, and blood oxygen.  IV anesthetics. IV anesthetics are given through an IV line in your arm. Theyre often given first. This is so you are asleep before a gas anesthetic is started. Some kinds of IV anesthetics relieve pain. Others relax you. Your doctor will decide which kind is best in  your case.  Gas anesthetics. Gas anesthetics are breathed into the lungs. They are often used to keep you asleep. They can be given through a facemask or a tube placed in your larynx or trachea (breathing tube).  If you have a facemask, your anesthesia provider will most likely place it over your nose and mouth while youre still awake. Youll breathe oxygen through the mask as your IV anesthetic is started. Gas anesthetic may be added through the mask.  If you have a tube in the larynx or trachea, it will be inserted into your throat after youre asleep.  Anesthesia tools and medicines  You will likely have:  IV anesthetics. These are put into an IV line into your bloodstream.  Gas anesthetics. You breathe these anesthetics into your lungs, where they pass into your bloodstream.  Pulse oximeter. This is a small clip that is attached to the end of your finger. This measures your blood oxygen level.  Electrocardiography leads (electrodes). These are small sticky pads that are placed on your chest. They record your heart rate and rhythm.  Blood pressure cuff. This reads your blood pressure.  Risks and possible complications  General anesthesia has some risks. These include:  Breathing problems  Nausea and vomiting  Sore throat or hoarseness (usually temporary)  Allergic reaction to the anesthetic  Irregular heartbeat (rare)  Cardiac arrest (rare)   Anesthesia safety  Follow all instructions you are given for how long not to eat or drink before your procedure.  Be sure your doctor knows what medicines and drugs you take. This includes over-the-counter medicines, herbs, supplements, alcohol or other drugs. You will be asked when those were last taken.  Have an adult family member or friend drive you home after the procedure.  For the first 24 hours after your surgery:  Do not drive or use heavy equipment.  Do not make important decisions or sign legal documents. If important decisions or signing legal documents is  necessary during the first 24 hours after surgery, have a trusted family member or spouse act on your behalf.  Avoid alcohol.  Have a responsible adult stay with you. He or she can watch for problems and help keep you safe.  Date Last Reviewed: 12/1/2016 © 2000-2017 [employer]. [address] [place] [address], [place] [address]. All rights reserved. This information is not intended as a substitute for professional medical care. Always follow your healthcare professional's instructions.

## 2024-06-24 ENCOUNTER — HOSPITAL ENCOUNTER (OUTPATIENT)
Dept: RADIOLOGY | Facility: HOSPITAL | Age: 64
Discharge: HOME | End: 2024-06-24
Attending: NEUROLOGICAL SURGERY
Payer: MEDICAID

## 2024-06-24 ENCOUNTER — OFFICE VISIT (OUTPATIENT)
Dept: INTERNAL MEDICINE | Facility: CLINIC | Age: 64
End: 2024-06-24
Payer: MEDICAID

## 2024-06-24 ENCOUNTER — HOSPITAL ENCOUNTER (OUTPATIENT)
Dept: PREADMISSION TESTING | Facility: HOSPITAL | Age: 64
Discharge: HOME | End: 2024-06-24
Attending: ANESTHESIOLOGY
Payer: MEDICAID

## 2024-06-24 ENCOUNTER — HOSPITAL ENCOUNTER (OUTPATIENT)
Dept: CARDIOLOGY | Facility: CLINIC | Age: 64
Discharge: HOME | End: 2024-06-24
Payer: MEDICAID

## 2024-06-24 VITALS
SYSTOLIC BLOOD PRESSURE: 126 MMHG | WEIGHT: 205 LBS | RESPIRATION RATE: 18 BRPM | OXYGEN SATURATION: 96 % | HEART RATE: 90 BPM | BODY MASS INDEX: 28.7 KG/M2 | HEIGHT: 71 IN | TEMPERATURE: 98 F | DIASTOLIC BLOOD PRESSURE: 86 MMHG

## 2024-06-24 DIAGNOSIS — I10 BENIGN ESSENTIAL HYPERTENSION: ICD-10-CM

## 2024-06-24 DIAGNOSIS — Z79.52 CURRENT CHRONIC USE OF SYSTEMIC STEROIDS: ICD-10-CM

## 2024-06-24 DIAGNOSIS — Z87.891 HISTORY OF TOBACCO USE: ICD-10-CM

## 2024-06-24 DIAGNOSIS — D32.9 MENINGIOMA: ICD-10-CM

## 2024-06-24 DIAGNOSIS — F41.1 GENERALIZED ANXIETY DISORDER: Primary | ICD-10-CM

## 2024-06-24 DIAGNOSIS — D75.839 THROMBOCYTOSIS: ICD-10-CM

## 2024-06-24 DIAGNOSIS — R60.9 EDEMA, UNSPECIFIED TYPE: ICD-10-CM

## 2024-06-24 DIAGNOSIS — E78.5 HYPERLIPIDEMIA, UNSPECIFIED HYPERLIPIDEMIA TYPE: ICD-10-CM

## 2024-06-24 DIAGNOSIS — Z01.818 PREOPERATIVE TESTING: ICD-10-CM

## 2024-06-24 DIAGNOSIS — R90.0 INTRACRANIAL MASS: ICD-10-CM

## 2024-06-24 DIAGNOSIS — K59.00 CONSTIPATION, UNSPECIFIED CONSTIPATION TYPE: ICD-10-CM

## 2024-06-24 DIAGNOSIS — D32.9 MENINGIOMA: Primary | ICD-10-CM

## 2024-06-24 PROBLEM — S22.000A THORACIC COMPRESSION FRACTURE: Status: ACTIVE | Noted: 2024-04-15

## 2024-06-24 LAB
OHS QRS DURATION: 90 MS
OHS QTC CALCULATION: 466 MS

## 2024-06-24 PROCEDURE — 99999 PR PBB SHADOW E&M-EST. PATIENT-LVL III: CPT | Mod: PBBFAC,,, | Performed by: HOSPITALIST

## 2024-06-24 PROCEDURE — 1160F RVW MEDS BY RX/DR IN RCRD: CPT | Mod: CPTII,,, | Performed by: HOSPITALIST

## 2024-06-24 PROCEDURE — 3074F SYST BP LT 130 MM HG: CPT | Mod: CPTII,,, | Performed by: HOSPITALIST

## 2024-06-24 PROCEDURE — 99215 OFFICE O/P EST HI 40 MIN: CPT | Mod: S$PBB,,, | Performed by: HOSPITALIST

## 2024-06-24 PROCEDURE — 93010 ELECTROCARDIOGRAM REPORT: CPT | Mod: S$PBB,,, | Performed by: INTERNAL MEDICINE

## 2024-06-24 PROCEDURE — 1159F MED LIST DOCD IN RCRD: CPT | Mod: CPTII,,, | Performed by: HOSPITALIST

## 2024-06-24 PROCEDURE — 99213 OFFICE O/P EST LOW 20 MIN: CPT | Mod: PBBFAC,25 | Performed by: HOSPITALIST

## 2024-06-24 PROCEDURE — A9585 GADOBUTROL INJECTION: HCPCS | Performed by: NEUROLOGICAL SURGERY

## 2024-06-24 PROCEDURE — 70552 MRI BRAIN STEM W/DYE: CPT | Mod: TC

## 2024-06-24 PROCEDURE — 25500020 PHARM REV CODE 255: Performed by: NEUROLOGICAL SURGERY

## 2024-06-24 PROCEDURE — 70552 MRI BRAIN STEM W/DYE: CPT | Mod: 26,,, | Performed by: RADIOLOGY

## 2024-06-24 PROCEDURE — 3079F DIAST BP 80-89 MM HG: CPT | Mod: CPTII,,, | Performed by: HOSPITALIST

## 2024-06-24 PROCEDURE — 3008F BODY MASS INDEX DOCD: CPT | Mod: CPTII,,, | Performed by: HOSPITALIST

## 2024-06-24 PROCEDURE — 93005 ELECTROCARDIOGRAM TRACING: CPT | Mod: PBBFAC | Performed by: INTERNAL MEDICINE

## 2024-06-24 RX ORDER — GADOBUTROL 604.72 MG/ML
10 INJECTION INTRAVENOUS
Status: COMPLETED | OUTPATIENT
Start: 2024-06-24 | End: 2024-06-24

## 2024-06-24 RX ADMIN — GADOBUTROL 10 ML: 604.72 INJECTION INTRAVENOUS at 05:06

## 2024-06-24 NOTE — ASSESSMENT & PLAN NOTE
She was found to have elevated platelet count on a routine physical about 10 years ago ( Around 2014 )  She was having gum bleeding around that time  She had hematology evaluation.  She does not recollect getting a bone marrow test  She would hysterectomy for heavy menstrual cycles about 20 years ago ( around 2004 )  She also passed clots vaginally at that time  Currently     No overt GI/ blood losses     She had hematology evaluation Trevon Ugarte   To her understanding, this is essential thrombocytosis    Had currently, she does not have any bleeding problems    Will consult hematologist for any recommendations in the perioperative    She was taking aspirin for clot prevention that she took in the past until she was diagnosed with meningioma  She never had blood clot problems    It was unclear if she had a bone marrow biopsy or not

## 2024-06-24 NOTE — ASSESSMENT & PLAN NOTE
HLD-I  suggest continuation of statin during the entire perioperative period.   If tolerated, I suggested continuation

## 2024-06-24 NOTE — ASSESSMENT & PLAN NOTE
For blood pressure, she is taking amlodipine and hydrochlorothiazide    Hypertension-  Blood pressure is acceptable . I suggest continuation of -amlodipine------- during the entire perioperative period. I suggest holding ---hydrochlorothiazide----- on the morning of the surgery and can continue that  post operatively under blood pressure, electrolyte and renal function monitoring as long as they are acceptable.I suggest addressing pain control as uncontrolled pain can increased blood pressure

## 2024-06-24 NOTE — ASSESSMENT & PLAN NOTE
Attributes to the newer medication  She is using fruit and occasional senna and has been staying hydrated which is helping in the bowel movements     Suggested working on bowel movements in preparation for surgery   Constipation- I suggest giving laxative regimen as opioid use,reduced ambulation  can increase the constipation

## 2024-06-24 NOTE — ASSESSMENT & PLAN NOTE
She was found to have a thoracic compression fracture when she fell out of the bed in April of 2024 but she does not have any pain or any trouble from this and she was given a brace which she did not wear    No l urinary incontinence

## 2024-06-24 NOTE — ASSESSMENT & PLAN NOTE
She would quit tobacco about 8 months ago    Not known to have COPD, Bronchitis, Emphysema, wheezing , chronic phlegm   No inhaler, oxygen use  She is breathing good

## 2024-06-24 NOTE — ASSESSMENT & PLAN NOTE
She is currently on dexamethasone 2 mg twice a day for 14 days starting 6/18 th   She has been intermittently on steroid treatment ever since she was diagnosed with meningioma on 04/2024   She has been on steroids for the most part since April of 2024    She has a effects of steroids in the form of weight gain and round face    She may need stress dose steroid given that she has been on steroids for a long time around the time of surgery    The current 4 mg a day dose is equal  to 26.7 mg of prednisone  Prior to that she was on 8 mg a day Decadron     50 mg hydrocortisone intravenously just before the procedure and 25 mg of hydrocortisone every eight hours for 24 hours.     As per chart - She was seen in the emergency department where she was placed on Decadron which she completed on 05/29.

## 2024-06-24 NOTE — OUTPATIENT SUBJECTIVE & OBJECTIVE
Outpatient Subjective & Objective     Chief complaint-Preoperative evaluation, Perioperative Medical management, complication reduction plan     Active cardiac conditions- none    She presented to the emergency room on May 29th with chest discomfort.  It was central  location.  She was evaluated in the emergency room with an EKG.  No associated sweating, short of breath, nausea, vomiting, diarrhea  She has not known to have any heart problems  She was discharged home from the emergency room  She wonders, if the chest pain is related to anxiety  She can walk  She was able to walk a 2 miles around the park and was going to the gym- stopped doing this about 2 months ago  She had not have any exertional symptoms    Revised cardiac risk index predictors- none    Functional capacity -Examples of physical activity  can take a flight of stairs holding on to the railing----- She can undertake all the above activities without  chest pain,chest tightness, Shortness of breath ,dizziness,lightheadedness making her exercise tolerance more,   than 4 Mets.       Review of Systems   Constitutional:  Negative for chills and fever.            Weight gain from reduced activity, steroid   HENT:          STOPBANG score  / 8      Elevated BP  Age over 50        Eyes:         No troublesome visual changes   Respiratory:          No cough , phlegm    No Hemoptysis   Cardiovascular:         As noted   Gastrointestinal:         No overt GI/ blood losses  Bowel movements-  constipated   Endocrine:        Currently on treatment with the dexamethasone     Genitourinary:  Negative for dysuria.        No urinary hesitancy    Musculoskeletal:           No unusual, muscle, joint pains   Skin:  Negative for rash.   Neurological:         No unilateral weakness   Hematological:         Current use of Anticoagulants  None       No past medical history pertinent negatives.    No alcohol excess for history    No anesthesia, bleeding, cardiac problems,  "PONV with previous surgeries/procedures.  Medications and Allergies reviewed in epic.   FH- No anesthesia,bleeding / venous thrombosis , in family      No problems of myelopathy namely problems with small objects, like keys coins or buttons, balance problems or dropping things  No leg weakness or urinary incontinence   Physical Exam  Blood pressure 126/86, pulse 90, temperature 98.4 °F (36.9 °C), temperature source Oral, resp. rate 18, height 5' 11" (1.803 m), weight 93 kg (205 lb), SpO2 96%.    I offered a sheet  and the presence of a chaperone during physical examination   She was comfortable to proceed with the exam without the the presence of a chaperone       Physical Exam  Constitutional- Vitals - Body mass index is 28.59 kg/m².,   Vitals:    06/24/24 1302   BP: 126/86   Pulse: 90   Resp: 18   Temp: 98.4 °F (36.9 °C)     General appearance-Conscious,Coherent  Eyes- No conjunctival icterus,pupils  round  and reactive to light   ENT-Oral cavity- no thrush and  moist    , Hearing grossly normal   Neck- No thyromegaly ,Trachea -central, No jugular venous distension,   No Carotid Bruit   Cardiovascular -Heart Sounds- Normal  and  occasional irregularity suggestive of ectopy   , No gallop rhythm   Respiratory - Normal Respiratory Effort, Normal breath sounds,  no wheeze , and  no forced expiratory wheeze    Peripheral pitting pedal edema-- mild, no calf pain   Gastrointestinal -Soft abdomen, No palpable masses, Non Tender,Liver,Spleen not palpable. No-- free fluid and shifting dullness  Musculoskeletal- No finger Clubbing. Strength grossly normal   Lymphatic-No Palpable cervical, axillary,Inguinal lymphadenopathy   Psychiatric - normal effect,Orientation  Rt Dorsalis pedis pulses-palpable    Lt Dorsalis pedis pulses- palpable   Rt Posterior tibial pulses -palpable   Left posterior tibial pulses -palpable   Miscellaneous -  no asterixis,  no dupuytren's contracture,  no renal bruit, and  Tattoo    Investigations  Lab " and Imaging have been reviewed in epic.    Lab testing from May 29th   CMP-N  MCVWas elevated but he is not known to have low B12 or folic acid    Review of old records- Was done and information gathered regards to events leading to surgery and health conditions of significance in the perioperative period.    Outpatient Subjective & Objective

## 2024-06-24 NOTE — ASSESSMENT & PLAN NOTE
She had a lot  bereavements in her family  She does not want to take benzodiazepines    Doing fairly  good from a mental health stand point   Not under psychiatry , Therapist care   Has supportive family   Not On Medication   No Suicidal / Homicidal ideation

## 2024-06-24 NOTE — ASSESSMENT & PLAN NOTE
Not known to have   Varicose veins     Long standing NSAID use     Not known to have     Heart failure   Liver failure   Kidney failure     Edema- I suggested avoidance of added salt,avoidance of NSAID's, unless advised or ordered  and suggested Limb elevation and bar hose use

## 2024-06-24 NOTE — PROGRESS NOTES
[facility]  Progress Note    Patient Name: Meghana Colón  MRN: [id number]  Date of Evaluation- 06/24/2024  PCP- Rod Diehl MD    Future cases for MEGHANA Colón [[id number]]       Case ID Status Date Time Vimal Procedure Provider Location    [id number] [facility] 7/1/2024  7:00  CRANIOTOMY, WITH NEOPLASM EXCISION USING COMPUTER-ASSISTED NAVIGATION Terry Marie DO [[id number]] NOM OR 2ND FLR            HPI:  History of present illness- I had the pleasure of meeting this pleasant 64 y.o. lady in the pre op clinic prior to her elective  Neuro   surgery. The patient is new to me . Ms Kowalski was accompanied by  Mr Beck.    I have obtained the history by speaking to the patient and by reviewing the electronic health records.    Events leading up to surgery / History of presenting illness -    Meningiona    I have obtained the information by speaking to her   She has a family history of brain cancer that seems to be a metastatic brain cancer from lung cancer in her sister  She passed January of 2024   Around that time she was having some anxiety with handling her sister  She was also having some panic  She also has had passing of her father in law around that time  She was seen and was given Xanax to help with the anxiety   On April 11th she fell at her house and was taken to the local hospital and evaluation led to the diagnosis of meningioma and she was sent to [facility] for for the care   She was sleeping at that time when she rolled over and landed on the concrete floor sustaining a head injury  Any preceding chest pain, chest tightness, heart racing , dizziness, lightheadedness, Shortness of breath, one sided weakness  She had urinary incontinence and to her understanding has had a seizure around the time of the fall    She is planning on having surgery done for this on  July 1st    She has been having occasional headaches since April of 2024, intermittently  She is taking Tylenol at  nighttime-she is taking 2 Tylenol p.m. medication      She has tremors of the right hand and occasional numbness of the right leg on the side    Thankfully , she had no recurrence of seizures while taking Keppra  No one-sided weakness  She has had no loss of consciousness since the initial episode when she was altered with her mental status and did not have any recollection of her moving from her house to the emergency room    Relevant health conditions of significance for the perioperative period/ History of presenting illness -    Health conditions of significance for the perioperative period      Thrombocytosis- was on aspirin and since 2024, this was discontinued  HTN  Former tobacco smoke  BMI 28.59   HLD  Current steroid use    Not known to have   heart problem , Prediabetes , Diabetes Mellitus, Lung problem, Thyroid problem, Kidney problem, deep vein thrombosis, pulmonary embolism, acid reflux, sleep apnea, COVID, fatty liver , vascular stenting ,  edema     Lives with   Single story house   Retired as an LPN in   Pets- Out side cat  Children - 1   Pregnancies - 2  Therapeutic  - once when she was young  C sections - None  Has help post op              Subjective/ Objective:     Chief complaint-Preoperative evaluation, Perioperative Medical management, complication reduction plan     Active cardiac conditions- none    She presented to the emergency room on May 29th with chest discomfort.  It was central  location.  She was evaluated in the emergency room with an EKG.  No associated sweating, short of breath, nausea, vomiting, diarrhea  She has not known to have any heart problems  She was discharged home from the emergency room  She wonders, if the chest pain is related to anxiety  She can walk  She was able to walk a 2 miles around the park and was going to the gym- stopped doing this about 2 months ago  She had not have any exertional symptoms    Revised cardiac risk index  "predictors- none    Functional capacity -Examples of physical activity  can take a flight of stairs holding on to the railing----- She can undertake all the above activities without  chest pain,chest tightness, Shortness of breath ,dizziness,lightheadedness making her exercise tolerance more,   than 4 Mets.       Review of Systems   Constitutional:  Negative for chills and fever.            Weight gain from reduced activity, steroid   HENT:          STOPBANG score  / 8      Elevated BP  Age over 50        Eyes:         No troublesome visual changes   Respiratory:          No cough , phlegm    No Hemoptysis   Cardiovascular:         As noted   Gastrointestinal:         No overt GI/ blood losses  Bowel movements-  constipated   Endocrine:        Currently on treatment with the dexamethasone     Genitourinary:  Negative for dysuria.        No urinary hesitancy    Musculoskeletal:           No unusual, muscle, joint pains   Skin:  Negative for rash.   Neurological:         No unilateral weakness   Hematological:         Current use of Anticoagulants  None       No past medical history pertinent negatives.    No alcohol excess for history    No anesthesia, bleeding, cardiac problems, PONV with previous surgeries/procedures.  Medications and Allergies reviewed in epic.   FH- No anesthesia,bleeding / venous thrombosis , in family      No problems of myelopathy namely problems with small objects, like keys coins or buttons, balance problems or dropping things  No leg weakness or urinary incontinence   Physical Exam  Blood pressure 126/86, pulse 90, temperature 98.4 °F (36.9 °C), temperature source Oral, resp. rate 18, height 5' 11" (1.803 m), weight 93 kg (205 lb), SpO2 96%.    I offered a sheet  and the presence of a chaperone during physical examination   She was comfortable to proceed with the exam without the the presence of a chaperone       Physical Exam  Constitutional- Vitals - Body mass index is 28.59 kg/m²., "   Vitals:    06/24/24 1302   BP: 126/86   Pulse: 90   Resp: 18   Temp: 98.4 °F (36.9 °C)     General appearance-Conscious,Coherent  Eyes- No conjunctival icterus,pupils  round  and reactive to light   ENT-Oral cavity- no thrush and  moist    , Hearing grossly normal   Neck- No thyromegaly ,Trachea -central, No jugular venous distension,   No Carotid Bruit   Cardiovascular -Heart Sounds- Normal  and  occasional irregularity suggestive of ectopy   , No gallop rhythm   Respiratory - Normal Respiratory Effort, Normal breath sounds,  no wheeze , and  no forced expiratory wheeze    Peripheral pitting pedal edema-- mild, no calf pain   Gastrointestinal -Soft abdomen, No palpable masses, Non Tender,Liver,Spleen not palpable. No-- free fluid and shifting dullness  Musculoskeletal- No finger Clubbing. Strength grossly normal   Lymphatic-No Palpable cervical, axillary,Inguinal lymphadenopathy   Psychiatric - normal effect,Orientation  Rt Dorsalis pedis pulses-palpable    Lt Dorsalis pedis pulses- palpable   Rt Posterior tibial pulses -palpable   Left posterior tibial pulses -palpable   Miscellaneous -  no asterixis,  no dupuytren's contracture,  no renal bruit, and  Tattoo    Investigations  Lab and Imaging have been reviewed in epic.    Lab testing from May 29th   CMP-N  MCVWas elevated but he is not known to have low B12 or folic acid    Review of old records- Was done and information gathered regards to events leading to surgery and health conditions of significance in the perioperative period.        Preoperative cardiac risk assessment-  The patient does not have any active cardiac conditions . Revised cardiac risk index predictors- 0---.Functional capacity is more than 4 Mets. She will be undergoing a Neuro procedure that carries a Moderate Risk risk     Risk of a major Cardiac event ( Defined as death, myocardial infarction, or cardiac arrest at 30 days after noncardiac surgery), based on RCRI score     -3.9%     Her  chest discomfort for which she presented to the emergency room towards end of May did not sound heart in nature and she believes that this is anxiety  I offered her cardiac evaluation given the benefit of doubt but she was comfortable to proceed without having a cardiac evaluation    Orders Placed This Encounter    E-Consult to [facility]       American Society of Anesthesiologists Physical status classification ( ASA ) class: 3     Postoperative pulmonary complication risk assessment:      ARISCAT ( [place]et) risk index- risk class -  Low, if duration of surgery is under 3 hours, intermediate, if duration of surgery is over 3 hours          Assessment/Plan:     Generalized anxiety disorder  She had a lot  bereavements in her family  She does not want to take benzodiazepines    Doing fairly  good from a mental health stand point   Not under psychiatry , Therapist care   Has supportive family   Not On Medication   No Suicidal / Homicidal ideation      Benign essential hypertension  For blood pressure, she is taking amlodipine and hydrochlorothiazide    Hypertension-  Blood pressure is acceptable . I suggest continuation of -amlodipine------- during the entire perioperative period. I suggest holding ---hydrochlorothiazide----- on the morning of the surgery and can continue that  post operatively under blood pressure, electrolyte and renal function monitoring as long as they are acceptable.I suggest addressing pain control as uncontrolled pain can increased blood pressure      Hyperlipidemia  HLD-I  suggest continuation of statin during the entire perioperative period.   If tolerated, I suggested continuation    Meningioma  Plan for surgery July 1, 2024    Thrombocytosis  She was found to have elevated platelet count on a routine physical about 10 years ago ( Around 2014 )  She was having gum bleeding around that time  She had hematology evaluation.  She does not recollect getting a bone marrow test  She would hysterectomy  for heavy menstrual cycles about 20 years ago ( around 2004 )  She also passed clots vaginally at that time  Currently     No overt GI/ blood losses     She had hematology evaluation Trevon Ugarte   To her understanding, this is essential thrombocytosis    Had currently, she does not have any bleeding problems    Will consult hematologist for any recommendations in the perioperative    She was taking aspirin for clot prevention that she took in the past until she was diagnosed with meningioma  She never had blood clot problems    It was unclear if she had a bone marrow biopsy or not     History of tobacco use  She would quit tobacco about 8 months ago    Not known to have COPD, Bronchitis, Emphysema, wheezing , chronic phlegm   No inhaler, oxygen use  She is breathing good      BMI 28.0-28.9,adult  Weight related conditions     Known to have     HTN  Hyperlipidemia       Not troubled with / Not known to have       Type 2 Diabetes   Prediabetes   Gout   Sleep apnea   Acid reflux   Fatty liver       Encouraged maintaining healthy weight for improved health     Current chronic use of systemic steroids  She is currently on dexamethasone 2 mg twice a day for 14 days starting 6/18 th   She has been intermittently on steroid treatment ever since she was diagnosed with meningioma on 04/2024   She has been on steroids for the most part since April of 2024    She has a effects of steroids in the form of weight gain and round face    She may need stress dose steroid given that she has been on steroids for a long time around the time of surgery    The current 4 mg a day dose is equal  to 26.7 mg of prednisone  Prior to that she was on 8 mg a day Decadron     50 mg hydrocortisone intravenously just before the procedure and 25 mg of hydrocortisone every eight hours for 24 hours.     As per chart - She was seen in the emergency department where she was placed on Decadron which she completed on 05/29.     Thoracic  compression fracture  She was found to have a thoracic compression fracture when she fell out of the bed in April of 2024 but she does not have any pain or any trouble from this and she was given a brace which she did not wear    No l urinary incontinence     Constipation  Attributes to the newer medication  She is using fruit and occasional senna and has been staying hydrated which is helping in the bowel movements     Suggested working on bowel movements in preparation for surgery   Constipation- I suggest giving laxative regimen as opioid use,reduced ambulation  can increase the constipation      Edema    Not known to have   Varicose veins     Long standing NSAID use     Not known to have     Heart failure   Liver failure   Kidney failure     Edema- I suggested avoidance of added salt,avoidance of NSAID's, unless advised or ordered  and suggested Limb elevation and bar hose use         Preventive perioperative care    Thromboembolic prophylaxis:  Her risk factors for thrombosis include surgical procedure and age.I suggest  thromboembolic prophylaxis ( mechanical/pharmacological, weighing the risk benefits of pharmacological agent use considering humaira procedural bleeding )  during the perioperative period.I suggested being active in the post operative period.      Postoperative pulmonary complication prophylaxis-Risk factors for post operative pulmonary complications include ASA class >2- I suggest incentive spirometry use, early ambulation, and end tidal carbon dioxide monitoring  , oral care , head end of bed elevation      Renal complication prophylaxis-Risk factors for renal complications include hypertension . I suggest keeping her well hydrated  in the perioperative period.     Surgical site Infection Prophylaxis-I  suggest appropriate antibiotic for Prophylaxis against Surgical site infections  No reported Staph infection  Skin antibacterial discussed              This visit was focused on Preoperative  evaluation, Perioperative Medical management, complication reduction plans. I suggest that the patient follows up with primary care or relevant sub specialists for ongoing health care.    I appreciate the opportunity to be involved in this patients care. Please feel free to contact me if there were any questions about this consultation.    Patient is optimized    Patient/ care giver/ Family member was instructed to call and update me about any changes to health,  medication, office visits ,testing out side of the [facility] operative care center , hospitalizations between now and surgery      Cheri Dorsey MD  Internal Medicine  [facility]   Cell Phone- [phone]    History of COVID - no   COVID vaccination status -Yes    COVID screening     No fever   No cough   No SOB  No sore throat   No loss of taste or smell   No muscle aches   No nausea, vomiting , diarrhea -      I have spent ---90--- minutes of time which includes, time spent to prepare to see the patient , obtaining history ,performing examination, counseling/Educating the patient , Documenting clinical information in the record    ----  6/24- 10 34 PM    Checked for over-the-counter medication    EKG personally reviewed , reportedly showed   Normal sinus rhythm   Minimal voltage criteria for LVH, may be normal variant   Borderline Abnormal ECG   No previous ECGs available   --  6/26/2024- 1707     Consultation requested from the hematologist with regards to thrombocytosis and has per the hematologist-    Recommendation: If patient has true myeloproliferative neoplasm essential thrombocytosis, this carries an independent thrombotic risk no matter the platelet count. Patient should be on routine DVT prophylaxis while in hospital, does not require additional prophylactic therapy. With platelet count currently normal there is no additional bleeding risk (this occurs when the platelet count starts to exceed 800)  Ok to proceed with  surgery  --  6/28/2024- 1629     Corresponded with the surgeon yesterday  about stress dose steroids    Called to follow up , spoke to her to address any concerns with the up coming surgery or any questions on Medication instructions -  Doing well ,No changes to Medication, Health -

## 2024-06-24 NOTE — HPI
History of present illness- I had the pleasure of meeting this pleasant 64 y.o. lady in the pre op clinic prior to her elective  Neuro   surgery. The patient is new to me . Ms Kowalski was accompanied by  Mr Kiran.    I have obtained the history by speaking to the patient and by reviewing the electronic health records.    Events leading up to surgery / History of presenting illness -    Meningiona    I have obtained the information by speaking to her   She has a family history of brain cancer that seems to be a metastatic brain cancer from lung cancer in her sister  She passed January of 2024   Around that time she was having some anxiety with handling her sister  She was also having some panic  She also has had passing of her father in law around that time  She was seen and was given Xanax to help with the anxiety   On April 11th she fell at her house and was taken to the local hospital and evaluation led to the diagnosis of meningioma and she was sent to [facility] for for the care   She was sleeping at that time when she rolled over and landed on the concrete floor sustaining a head injury  Any preceding chest pain, chest tightness, heart racing , dizziness, lightheadedness, Shortness of breath, one sided weakness  She had urinary incontinence and to her understanding has had a seizure around the time of the fall    She is planning on having surgery done for this on  July 1st    She has been having occasional headaches since April of 2024, intermittently  She is taking Tylenol at nighttime-she is taking 2 Tylenol p.m. medication      She has tremors of the right hand and occasional numbness of the right leg on the side    Thankfully , she had no recurrence of seizures while taking Keppra  No one-sided weakness  She has had no loss of consciousness since the initial episode when she was altered with her mental status and did not have any recollection of her moving from her house to the emergency  room    Relevant health conditions of significance for the perioperative period/ History of presenting illness -    Health conditions of significance for the perioperative period      Thrombocytosis- was on aspirin and since 2024, this was discontinued  HTN  Former tobacco smoke  BMI 28.59   HLD  Current steroid use    Not known to have   heart problem , Prediabetes , Diabetes Mellitus, Lung problem, Thyroid problem, Kidney problem, deep vein thrombosis, pulmonary embolism, acid reflux, sleep apnea, COVID, fatty liver , vascular stenting ,  edema     Lives with   Single story house   Retired as an LPN in   Pets- Out side cat  Children - 1   Pregnancies - 2  Therapeutic  - once when she was young  C sections - None  Has help post op

## 2024-06-24 NOTE — ASSESSMENT & PLAN NOTE
Weight related conditions     Known to have     HTN  Hyperlipidemia       Not troubled with / Not known to have       Type 2 Diabetes   Prediabetes   Gout   Sleep apnea   Acid reflux   Fatty liver       Encouraged maintaining healthy weight for improved health

## 2024-06-25 ENCOUNTER — E-CONSULT (OUTPATIENT)
Dept: TRANSPLANT | Facility: HOSPITAL | Age: 64
End: 2024-06-25
Payer: MEDICAID

## 2024-06-25 DIAGNOSIS — D75.839 THROMBOCYTOSIS: Primary | ICD-10-CM

## 2024-06-25 PROCEDURE — 99451 NTRPROF PH1/NTRNET/EHR 5/>: CPT | Mod: ,,, | Performed by: INTERNAL MEDICINE

## 2024-06-25 NOTE — CONSULTS
[facility]  Response for E-Consult     Patient Name: Meghana Colón  MRN: [id number]  Primary Care Provider: Rod Diehl MD   Requesting Provider: Cheri Dorsey*  Consults    Recommendation: If patient has true myeloproliferative neoplasm essential thrombocytosis, this carries an independent thrombotic risk no matter the platelet count. Patient should be on routine DVT prophylaxis while in hospital, does not require additional prophylactic therapy. With platelet count currently normal there is no additional bleeding risk (this occurs when the platelet count starts to exceed 800)  Yes, ok to proceed with surgery    Contingency that warrants a repeat eConsult or referral: none, consult with any additional questions.    Total time of Consultation: 5 minute    I did not speak to the requesting provider verbally about this.     *This eConsult is based on the clinical data available to me and is furnished without benefit of a physical examination. The eConsult will need to be interpreted in light of any clinical issues or changes in patient status not available to me at the time of filing this eConsults. Significant changes in patient condition or level of acuity should result in immediate formal consultation and reevaluation. Please alert me if you have further questions.    Thank you for this eConsult referral.     Antoinette Bruner MD  [facility]

## 2024-06-26 ENCOUNTER — PATIENT MESSAGE (OUTPATIENT)
Dept: NEUROSURGERY | Facility: CLINIC | Age: 64
End: 2024-06-26
Payer: MEDICAID

## 2024-06-30 ENCOUNTER — ANESTHESIA EVENT (OUTPATIENT)
Dept: SURGERY | Facility: HOSPITAL | Age: 64
End: 2024-06-30
Payer: MEDICAID

## 2024-06-30 RX ORDER — HYDROMORPHONE HYDROCHLORIDE 1 MG/ML
0.2 INJECTION, SOLUTION INTRAMUSCULAR; INTRAVENOUS; SUBCUTANEOUS EVERY 5 MIN PRN
OUTPATIENT
Start: 2024-06-30

## 2024-06-30 RX ORDER — SODIUM CHLORIDE 0.9 % (FLUSH) 0.9 %
10 SYRINGE (ML) INJECTION
OUTPATIENT
Start: 2024-06-30

## 2024-06-30 RX ORDER — HALOPERIDOL 5 MG/ML
0.5 INJECTION INTRAMUSCULAR EVERY 10 MIN PRN
OUTPATIENT
Start: 2024-06-30

## 2024-06-30 NOTE — ANESTHESIA PREPROCEDURE EVALUATION
[facility]  Anesthesia Pre-Operative Evaluation         Patient Name: Meghana Colón  YOB: 1960  MRN: [id number]    SUBJECTIVE:     Pre-operative evaluation for Procedure(s) (LRB):  CRANIOTOMY, WITH NEOPLASM EXCISION USING COMPUTER-ASSISTED NAVIGATION (Right)     2024    Meghana Colón is a 64 y.o. female w/ a significant PMHx of HLD, HTN, essential thrombocytosis, former smoker, and recently diagnosed right lateral sphenoid extradural mass likely meningioma on imaging following fall with LOC at home  thought to be due to seizure.    she has a current medication list which includes the following long-term medication(s): amlodipine, aspirin, atorvastatin, hydrochlorothiazide, and levetiracetam. Instructed to continue amlodipine, hold HCTZ day of surgery, hold ASA, and all other anticoagulants 1 week prior to surgery.    Patient now presents for the above procedure(s).    LDA: None documented.  Drips: None documented.    Prev airway: None documented.    Medications:     Current Outpatient Medications   Medication Instructions    amLODIPine (NORVASC) 10 mg, Oral, Daily    aspirin (ECOTRIN) 81 mg, Daily    atorvastatin (LIPITOR) 20 MG tablet TAKE 1 TABLET BY MOUTH EVERY DAY    dexAMETHasone (DECADRON) 2 mg, Oral, Every 12 hours    diphenhydrAMINE-acetaminophen (TYLENOL PM)  mg Tab 2 tablets, Oral, Nightly    ergocalciferol (ERGOCALCIFEROL) 50,000 Units, Oral, Every 7 days    hydroCHLOROthiazide (HYDRODIURIL) 25 mg, Oral    levETIRAcetam (KEPPRA) 500 mg, 2 times daily    sennosides (SENNA ORAL) 1 tablet, Oral, As needed (PRN), Constipation       History:     Past Medical History:   Diagnosis Date    Hyperlipidemia     Hypertension      Surgical History:    has a past surgical history that includes Colonoscopy (2016); Bone marrow biopsy; Therapeutic ; Breast biopsy; and Hysterectomy.     OBJECTIVE:   Vital Signs Range:  Wt Readings from Last 1 Encounters:    06/24/24 93 kg (205 lb)      BMI Readings from Last 1 Encounters:   06/24/24 28.59 kg/m²     BP Readings from Last 3 Encounters:   06/24/24 126/86   06/18/24 (!) 137/99   07/27/23 138/88     Pulse Readings from Last 3 Encounters:   06/24/24 90   06/18/24 108   07/27/23 72     Significant Labs:      Component Value Date/Time    WBC 5.80 07/27/2023 1102    HGB 13.9 07/27/2023 1102    HCT 42.7 07/27/2023 1102     (H) 07/27/2023 1102     04/15/2024 0531    K 4.5 04/15/2024 0531     04/15/2024 0531    CO2 28 04/15/2024 0531    BUN 19 04/15/2024 0531    CREATININE 0.68 04/15/2024 0531    CALCIUM 9.0 04/15/2024 0531    ALBUMIN 4.5 07/27/2023 1102    ALKPHOS 49 07/27/2023 1102    BILITOT 0.5 07/27/2023 1102    AST 16 07/27/2023 1102    ALT 15 07/27/2023 1102    INR 1.0 04/11/2024 0245    HGBA1C 5.4 07/27/2023 1102      Please see Results Review for additional labs.     Diagnostic Studies: No relevant studies.    EKG:   Results for orders placed or performed during the hospital encounter of 06/24/24   EKG 12-lead    Collection Time: 06/24/24  3:39 PM   Result Value Ref Range    QRS Duration 90 ms    [facility] QTC Calculation 466 ms    Narrative    Test Reason : Z01.818,    Vent. Rate : 085 BPM     Atrial Rate : 085 BPM     P-R Int : 154 ms          QRS Dur : 090 ms      QT Int : 392 ms       P-R-T Axes : 041 004 024 degrees     QTc Int : 466 ms    Normal sinus rhythm  Minimal voltage criteria for LVH, may be normal variant  Borderline Abnormal ECG  No previous ECGs available  Confirmed by Ronny Zurita MD ([id number]) on 6/24/2024 4:32:58 PM    Referred By: RHEA MAHMOOD           Confirmed By:Ronny Zurita MD     ECHO:  See subjective, if available.  ASSESSMENT/PLAN:     Pre-op Assessment    I have reviewed the Patient Summary Reports.     I have reviewed the Nursing Notes. I have reviewed the NPO Status.   I have reviewed the Medications.     Review of Systems  Anesthesia Hx:  No problems with previous  Anesthesia               Denies Personal Hx of Anesthesia complications.                    Social:  Former Smoker, No Alcohol Use Quit smoking 8 months prior  Many family members have  due to brain metastasis      Hematology/Oncology:       -- Anemia:               Hematology Comments: THROMBOCYTOPENIA                Oncology Comments: Bone marrow biopsy  MENINGIOMA     Cardiovascular:  Exercise tolerance: good   Denies Pacemaker. Hypertension, well controlled       Denies Angina.    denies PVD hyperlipidemia  Denies BARRAZA.    Functional Capacity 4 METS                         Pulmonary:    Denies COPD.  Denies Asthma.   Denies Shortness of breath.  Denies Recent URI.  Denies Sleep Apnea.                Renal/:   Denies Chronic Renal Disease. no renal calculi  Resolved uti             Hepatic/GI:   Denies PUD.  GERD, well controlled Denies Liver Disease.  Denies Hepatitis. 2016  Colonoscopy            Musculoskeletal:   Musculoskeletal General/Symptoms:  Functional capacity is ambulatory without assistance. Occasional pain           OB/GYN/PEDS:  Date Unknown:  Breast biopsy  Hysterectomy  Therapeutic              Neurological:       Seizures             Brain Tumor, Meningioma     2024 1st and only seizure              Endocrine:  Denies Diabetes. Denies Hypothyroidism.  Denies Hyperthyroidism.         Psych:  Psychiatric History anxiety  insomnia               Physical Exam    Airway:  Mallampati: II   Mouth Opening: Normal  Tongue: Normal    Chest/Lungs:  Normal Respiratory Rate    Heart:  Rhythm: Regular Rhythm        Anesthesia Plan  Type of Anesthesia, risks & benefits discussed:    Anesthesia Type: Gen ETT  Intra-op Monitoring Plan: Standard ASA Monitors and Art Line  Post Op Pain Control Plan: multimodal analgesia and IV/PO Opioids PRN  Induction:  IV  Airway Plan: Direct and Video, Post-Induction  Informed Consent: Informed consent signed with the Patient and all parties understand  the risks and agree with anesthesia plan.  All questions answered.   ASA Score: 3  Day of Surgery Review of History & Physical: H&P Update referred to the surgeon/provider.    Ready For Surgery From Anesthesia Perspective.     .

## 2024-07-01 ENCOUNTER — ANESTHESIA (OUTPATIENT)
Dept: SURGERY | Facility: HOSPITAL | Age: 64
End: 2024-07-01
Payer: MEDICAID

## 2024-07-01 ENCOUNTER — HOSPITAL ENCOUNTER (INPATIENT)
Facility: HOSPITAL | Age: 64
LOS: 2 days | Discharge: HOME | DRG: 025 | End: 2024-07-03
Attending: NEUROLOGICAL SURGERY | Admitting: NEUROLOGICAL SURGERY
Payer: MEDICAID

## 2024-07-01 DIAGNOSIS — I10 BENIGN ESSENTIAL HYPERTENSION: Primary | ICD-10-CM

## 2024-07-01 DIAGNOSIS — D32.9 MENINGIOMA: ICD-10-CM

## 2024-07-01 DIAGNOSIS — D75.839 THROMBOCYTOSIS: ICD-10-CM

## 2024-07-01 DIAGNOSIS — E78.5 HYPERLIPIDEMIA, UNSPECIFIED HYPERLIPIDEMIA TYPE: ICD-10-CM

## 2024-07-01 LAB
ABO + RH BLD: NORMAL
ANION GAP SERPL CALC-SCNC: 10 MMOL/L (ref 8–16)
APTT PPP: 24.1 SEC (ref 21–32)
BASOPHILS # BLD AUTO: 0.04 K/UL (ref 0–0.2)
BASOPHILS # BLD AUTO: 0.06 K/UL (ref 0–0.2)
BASOPHILS NFR BLD: 0.4 % (ref 0–1.9)
BASOPHILS NFR BLD: 0.4 % (ref 0–1.9)
BLD GP AB SCN CELLS X3 SERPL QL: NORMAL
BUN SERPL-MCNC: 12 MG/DL (ref 8–23)
CALCIUM SERPL-MCNC: 8.1 MG/DL (ref 8.7–10.5)
CHLORIDE SERPL-SCNC: 108 MMOL/L (ref 95–110)
CO2 SERPL-SCNC: 21 MMOL/L (ref 23–29)
CREAT SERPL-MCNC: 0.6 MG/DL (ref 0.5–1.4)
DIFFERENTIAL METHOD BLD: ABNORMAL
DIFFERENTIAL METHOD BLD: ABNORMAL
EOSINOPHIL # BLD AUTO: 0 K/UL (ref 0–0.5)
EOSINOPHIL # BLD AUTO: 0 K/UL (ref 0–0.5)
EOSINOPHIL NFR BLD: 0.1 % (ref 0–8)
EOSINOPHIL NFR BLD: 0.3 % (ref 0–8)
ERYTHROCYTE [DISTWIDTH] IN BLOOD BY AUTOMATED COUNT: 15.5 % (ref 11.5–14.5)
ERYTHROCYTE [DISTWIDTH] IN BLOOD BY AUTOMATED COUNT: 15.7 % (ref 11.5–14.5)
EST. GFR  (NO RACE VARIABLE): >60 ML/MIN/1.73 M^2
GLUCOSE SERPL-MCNC: 143 MG/DL (ref 70–110)
HCT VFR BLD AUTO: 37 % (ref 37–48.5)
HCT VFR BLD AUTO: 37.7 % (ref 37–48.5)
HGB BLD-MCNC: 12.1 G/DL (ref 12–16)
HGB BLD-MCNC: 12.9 G/DL (ref 12–16)
IMM GRANULOCYTES # BLD AUTO: 0.36 K/UL (ref 0–0.04)
IMM GRANULOCYTES # BLD AUTO: 0.39 K/UL (ref 0–0.04)
IMM GRANULOCYTES NFR BLD AUTO: 2.5 % (ref 0–0.5)
IMM GRANULOCYTES NFR BLD AUTO: 3.2 % (ref 0–0.5)
INR PPP: 1 (ref 0.8–1.2)
LYMPHOCYTES # BLD AUTO: 1.2 K/UL (ref 1–4.8)
LYMPHOCYTES # BLD AUTO: 2.1 K/UL (ref 1–4.8)
LYMPHOCYTES NFR BLD: 18.7 % (ref 18–48)
LYMPHOCYTES NFR BLD: 7.8 % (ref 18–48)
MCH RBC QN AUTO: 33 PG (ref 27–31)
MCH RBC QN AUTO: 33.5 PG (ref 27–31)
MCHC RBC AUTO-ENTMCNC: 32.7 G/DL (ref 32–36)
MCHC RBC AUTO-ENTMCNC: 34.2 G/DL (ref 32–36)
MCV RBC AUTO: 101 FL (ref 82–98)
MCV RBC AUTO: 98 FL (ref 82–98)
MONOCYTES # BLD AUTO: 0.6 K/UL (ref 0.3–1)
MONOCYTES # BLD AUTO: 1 K/UL (ref 0.3–1)
MONOCYTES NFR BLD: 3.9 % (ref 4–15)
MONOCYTES NFR BLD: 9.2 % (ref 4–15)
NEUTROPHILS # BLD AUTO: 13.5 K/UL (ref 1.8–7.7)
NEUTROPHILS # BLD AUTO: 7.7 K/UL (ref 1.8–7.7)
NEUTROPHILS NFR BLD: 68.2 % (ref 38–73)
NEUTROPHILS NFR BLD: 85.3 % (ref 38–73)
NRBC BLD-RTO: 0 /100 WBC
NRBC BLD-RTO: 0 /100 WBC
PLATELET # BLD AUTO: 528 K/UL (ref 150–450)
PLATELET # BLD AUTO: 539 K/UL (ref 150–450)
PMV BLD AUTO: 10.2 FL (ref 9.2–12.9)
PMV BLD AUTO: 9.7 FL (ref 9.2–12.9)
POCT GLUCOSE: 149 MG/DL (ref 70–110)
POTASSIUM SERPL-SCNC: 3.3 MMOL/L (ref 3.5–5.1)
PROTHROMBIN TIME: 11.4 SEC (ref 9–12.5)
RBC # BLD AUTO: 3.67 M/UL (ref 4–5.4)
RBC # BLD AUTO: 3.85 M/UL (ref 4–5.4)
SODIUM SERPL-SCNC: 139 MMOL/L (ref 136–145)
SPECIMEN OUTDATE: NORMAL
WBC # BLD AUTO: 11.32 K/UL (ref 3.9–12.7)
WBC # BLD AUTO: 15.83 K/UL (ref 3.9–12.7)

## 2024-07-01 PROCEDURE — 27201037 HC PRESSURE MONITORING SET UP

## 2024-07-01 PROCEDURE — 88307 TISSUE EXAM BY PATHOLOGIST: CPT | Performed by: STUDENT IN AN ORGANIZED HEALTH CARE EDUCATION/TRAINING PROGRAM

## 2024-07-01 PROCEDURE — 36000713 HC OR TIME LEV V EA ADD 15 MIN: Performed by: NEUROLOGICAL SURGERY

## 2024-07-01 PROCEDURE — 25000003 PHARM REV CODE 250: Performed by: NURSE PRACTITIONER

## 2024-07-01 PROCEDURE — 61512 CRNEC TREPH EXC MNGIOMA STTL: CPT | Mod: ,,, | Performed by: NEUROLOGICAL SURGERY

## 2024-07-01 PROCEDURE — 99499 UNLISTED E&M SERVICE: CPT | Mod: ,,, | Performed by: REGISTERED NURSE

## 2024-07-01 PROCEDURE — 99900035 HC TECH TIME PER 15 MIN (STAT)

## 2024-07-01 PROCEDURE — 63600175 PHARM REV CODE 636 W HCPCS

## 2024-07-01 PROCEDURE — 63600175 PHARM REV CODE 636 W HCPCS: Performed by: REGISTERED NURSE

## 2024-07-01 PROCEDURE — 88342 IMHCHEM/IMCYTCHM 1ST ANTB: CPT | Performed by: STUDENT IN AN ORGANIZED HEALTH CARE EDUCATION/TRAINING PROGRAM

## 2024-07-01 PROCEDURE — 25000003 PHARM REV CODE 250

## 2024-07-01 PROCEDURE — 85025 COMPLETE CBC W/AUTO DIFF WBC: CPT | Mod: 91 | Performed by: ANESTHESIOLOGY

## 2024-07-01 PROCEDURE — 37000009 HC ANESTHESIA EA ADD 15 MINS: Performed by: NEUROLOGICAL SURGERY

## 2024-07-01 PROCEDURE — 99223 1ST HOSP IP/OBS HIGH 75: CPT | Mod: FS,,, | Performed by: PSYCHIATRY & NEUROLOGY

## 2024-07-01 PROCEDURE — 20000000 HC ICU ROOM

## 2024-07-01 PROCEDURE — C1713 ANCHOR/SCREW BN/BN,TIS/BN: HCPCS | Performed by: NEUROLOGICAL SURGERY

## 2024-07-01 PROCEDURE — 85610 PROTHROMBIN TIME: CPT | Performed by: ANESTHESIOLOGY

## 2024-07-01 PROCEDURE — 86900 BLOOD TYPING SEROLOGIC ABO: CPT | Performed by: NEUROLOGICAL SURGERY

## 2024-07-01 PROCEDURE — 36620 INSERTION CATHETER ARTERY: CPT | Mod: 59,,, | Performed by: ANESTHESIOLOGY

## 2024-07-01 PROCEDURE — 94761 N-INVAS EAR/PLS OXIMETRY MLT: CPT

## 2024-07-01 PROCEDURE — 86850 RBC ANTIBODY SCREEN: CPT | Performed by: NEUROLOGICAL SURGERY

## 2024-07-01 PROCEDURE — 88341 IMHCHEM/IMCYTCHM EA ADD ANTB: CPT | Mod: 59 | Performed by: STUDENT IN AN ORGANIZED HEALTH CARE EDUCATION/TRAINING PROGRAM

## 2024-07-01 PROCEDURE — 25000003 PHARM REV CODE 250: Performed by: NEUROLOGICAL SURGERY

## 2024-07-01 PROCEDURE — 36000712 HC OR TIME LEV V 1ST 15 MIN: Performed by: NEUROLOGICAL SURGERY

## 2024-07-01 PROCEDURE — 25000003 PHARM REV CODE 250: Performed by: REGISTERED NURSE

## 2024-07-01 PROCEDURE — 88331 PATH CONSLTJ SURG 1 BLK 1SPC: CPT | Performed by: STUDENT IN AN ORGANIZED HEALTH CARE EDUCATION/TRAINING PROGRAM

## 2024-07-01 PROCEDURE — 63600175 PHARM REV CODE 636 W HCPCS: Performed by: NEUROLOGICAL SURGERY

## 2024-07-01 PROCEDURE — 25000003 PHARM REV CODE 250: Performed by: STUDENT IN AN ORGANIZED HEALTH CARE EDUCATION/TRAINING PROGRAM

## 2024-07-01 PROCEDURE — 61781 SCAN PROC CRANIAL INTRA: CPT | Mod: ,,, | Performed by: NEUROLOGICAL SURGERY

## 2024-07-01 PROCEDURE — 27201423 OPTIME MED/SURG SUP & DEVICES STERILE SUPPLY: Performed by: NEUROLOGICAL SURGERY

## 2024-07-01 PROCEDURE — 86901 BLOOD TYPING SEROLOGIC RH(D): CPT | Performed by: NEUROLOGICAL SURGERY

## 2024-07-01 PROCEDURE — 69990 MICROSURGERY ADD-ON: CPT | Mod: 59,,, | Performed by: NEUROLOGICAL SURGERY

## 2024-07-01 PROCEDURE — 37000008 HC ANESTHESIA 1ST 15 MINUTES: Performed by: NEUROLOGICAL SURGERY

## 2024-07-01 PROCEDURE — 85025 COMPLETE CBC W/AUTO DIFF WBC: CPT | Performed by: STUDENT IN AN ORGANIZED HEALTH CARE EDUCATION/TRAINING PROGRAM

## 2024-07-01 PROCEDURE — 63600175 PHARM REV CODE 636 W HCPCS: Performed by: STUDENT IN AN ORGANIZED HEALTH CARE EDUCATION/TRAINING PROGRAM

## 2024-07-01 PROCEDURE — 86920 COMPATIBILITY TEST SPIN: CPT

## 2024-07-01 PROCEDURE — 00B10ZZ EXCISION OF CEREBRAL MENINGES, OPEN APPROACH: ICD-10-PCS | Performed by: NEUROLOGICAL SURGERY

## 2024-07-01 PROCEDURE — 85730 THROMBOPLASTIN TIME PARTIAL: CPT | Performed by: ANESTHESIOLOGY

## 2024-07-01 PROCEDURE — 25500020 PHARM REV CODE 255: Performed by: NEUROLOGICAL SURGERY

## 2024-07-01 PROCEDURE — 63600175 PHARM REV CODE 636 W HCPCS: Performed by: NURSE PRACTITIONER

## 2024-07-01 PROCEDURE — C1762 CONN TISS, HUMAN(INC FASCIA): HCPCS | Performed by: NEUROLOGICAL SURGERY

## 2024-07-01 PROCEDURE — 80048 BASIC METABOLIC PNL TOTAL CA: CPT | Performed by: STUDENT IN AN ORGANIZED HEALTH CARE EDUCATION/TRAINING PROGRAM

## 2024-07-01 PROCEDURE — A9585 GADOBUTROL INJECTION: HCPCS | Performed by: NEUROLOGICAL SURGERY

## 2024-07-01 PROCEDURE — C1729 CATH, DRAINAGE: HCPCS | Performed by: NEUROLOGICAL SURGERY

## 2024-07-01 DEVICE — IMPLANTABLE DEVICE: Type: IMPLANTABLE DEVICE | Site: CRANIAL | Status: FUNCTIONAL

## 2024-07-01 DEVICE — PLATE BONE BUR HLE CVR 7MM TAB: Type: IMPLANTABLE DEVICE | Site: CRANIAL | Status: FUNCTIONAL

## 2024-07-01 DEVICE — PLATE BONE BUR HOLE COVER 10MM: Type: IMPLANTABLE DEVICE | Site: CRANIAL | Status: FUNCTIONAL

## 2024-07-01 DEVICE — SCREW UN3 AXS SD 1.5X4MM: Type: IMPLANTABLE DEVICE | Site: CRANIAL | Status: FUNCTIONAL

## 2024-07-01 RX ORDER — ACETAMINOPHEN 500 MG
1000 TABLET ORAL
Status: COMPLETED | OUTPATIENT
Start: 2024-07-01 | End: 2024-07-01

## 2024-07-01 RX ORDER — SODIUM CHLORIDE 9 MG/ML
INJECTION, SOLUTION INTRAVENOUS CONTINUOUS
Status: ACTIVE | OUTPATIENT
Start: 2024-07-01 | End: 2024-07-02

## 2024-07-01 RX ORDER — AMOXICILLIN 250 MG
2 CAPSULE ORAL 2 TIMES DAILY
Status: DISCONTINUED | OUTPATIENT
Start: 2024-07-01 | End: 2024-07-03 | Stop reason: HOSPADM

## 2024-07-01 RX ORDER — MUPIROCIN 20 MG/G
1 OINTMENT TOPICAL 2 TIMES DAILY
Status: DISCONTINUED | OUTPATIENT
Start: 2024-07-01 | End: 2024-07-01 | Stop reason: HOSPADM

## 2024-07-01 RX ORDER — PROPOFOL 10 MG/ML
VIAL (ML) INTRAVENOUS CONTINUOUS PRN
Status: DISCONTINUED | OUTPATIENT
Start: 2024-07-01 | End: 2024-07-01

## 2024-07-01 RX ORDER — OXYCODONE HYDROCHLORIDE 5 MG/1
5 TABLET ORAL EVERY 4 HOURS PRN
Status: DISCONTINUED | OUTPATIENT
Start: 2024-07-01 | End: 2024-07-03 | Stop reason: HOSPADM

## 2024-07-01 RX ORDER — ONDANSETRON HYDROCHLORIDE 2 MG/ML
4 INJECTION, SOLUTION INTRAVENOUS EVERY 12 HOURS PRN
Status: DISCONTINUED | OUTPATIENT
Start: 2024-07-01 | End: 2024-07-03 | Stop reason: HOSPADM

## 2024-07-01 RX ORDER — OXYCODONE HYDROCHLORIDE 10 MG/1
10 TABLET ORAL EVERY 6 HOURS PRN
Status: DISCONTINUED | OUTPATIENT
Start: 2024-07-01 | End: 2024-07-03 | Stop reason: HOSPADM

## 2024-07-01 RX ORDER — POLYETHYLENE GLYCOL 3350 17 G/17G
17 POWDER, FOR SOLUTION ORAL DAILY
Status: DISCONTINUED | OUTPATIENT
Start: 2024-07-01 | End: 2024-07-03 | Stop reason: HOSPADM

## 2024-07-01 RX ORDER — FENTANYL CITRATE 50 UG/ML
INJECTION, SOLUTION INTRAMUSCULAR; INTRAVENOUS
Status: DISCONTINUED | OUTPATIENT
Start: 2024-07-01 | End: 2024-07-01

## 2024-07-01 RX ORDER — ATORVASTATIN CALCIUM 20 MG/1
20 TABLET, FILM COATED ORAL NIGHTLY
Status: DISCONTINUED | OUTPATIENT
Start: 2024-07-01 | End: 2024-07-01

## 2024-07-01 RX ORDER — LIDOCAINE HYDROCHLORIDE 10 MG/ML
1 INJECTION, SOLUTION EPIDURAL; INFILTRATION; INTRACAUDAL; PERINEURAL ONCE AS NEEDED
Status: COMPLETED | OUTPATIENT
Start: 2024-07-01 | End: 2024-07-01

## 2024-07-01 RX ORDER — AMLODIPINE BESYLATE 10 MG/1
10 TABLET ORAL DAILY
Status: DISCONTINUED | OUTPATIENT
Start: 2024-07-02 | End: 2024-07-03 | Stop reason: HOSPADM

## 2024-07-01 RX ORDER — NICARDIPINE HYDROCHLORIDE 0.2 MG/ML
INJECTION INTRAVENOUS CONTINUOUS PRN
Status: DISCONTINUED | OUTPATIENT
Start: 2024-07-01 | End: 2024-07-01

## 2024-07-01 RX ORDER — KETAMINE HCL IN 0.9 % NACL 50 MG/5 ML
SYRINGE (ML) INTRAVENOUS
Status: DISCONTINUED | OUTPATIENT
Start: 2024-07-01 | End: 2024-07-01

## 2024-07-01 RX ORDER — BACITRACIN ZINC 500 UNIT/G
OINTMENT (GRAM) TOPICAL
Status: DISCONTINUED | OUTPATIENT
Start: 2024-07-01 | End: 2024-07-01 | Stop reason: HOSPADM

## 2024-07-01 RX ORDER — LEVETIRACETAM 500 MG/5ML
INJECTION, SOLUTION, CONCENTRATE INTRAVENOUS
Status: DISCONTINUED | OUTPATIENT
Start: 2024-07-01 | End: 2024-07-01

## 2024-07-01 RX ORDER — FAMOTIDINE 20 MG/1
20 TABLET, FILM COATED ORAL 2 TIMES DAILY
Status: DISCONTINUED | OUTPATIENT
Start: 2024-07-01 | End: 2024-07-03 | Stop reason: HOSPADM

## 2024-07-01 RX ORDER — LIDOCAINE HYDROCHLORIDE 20 MG/ML
INJECTION, SOLUTION EPIDURAL; INFILTRATION; INTRACAUDAL; PERINEURAL
Status: DISCONTINUED | OUTPATIENT
Start: 2024-07-01 | End: 2024-07-01

## 2024-07-01 RX ORDER — METHOCARBAMOL 500 MG/1
500 TABLET, FILM COATED ORAL 4 TIMES DAILY
Status: DISCONTINUED | OUTPATIENT
Start: 2024-07-01 | End: 2024-07-03 | Stop reason: HOSPADM

## 2024-07-01 RX ORDER — ALPRAZOLAM 0.25 MG/1
0.25 TABLET ORAL ONCE
Status: COMPLETED | OUTPATIENT
Start: 2024-07-01 | End: 2024-07-01

## 2024-07-01 RX ORDER — GENTAMICIN 40 MG/ML
INJECTION, SOLUTION INTRAMUSCULAR; INTRAVENOUS
Status: DISCONTINUED | OUTPATIENT
Start: 2024-07-01 | End: 2024-07-01 | Stop reason: HOSPADM

## 2024-07-01 RX ORDER — ESCITALOPRAM OXALATE 10 MG/1
10 TABLET ORAL DAILY
Status: DISCONTINUED | OUTPATIENT
Start: 2024-07-01 | End: 2024-07-03 | Stop reason: HOSPADM

## 2024-07-01 RX ORDER — HYDRALAZINE HYDROCHLORIDE 20 MG/ML
10 INJECTION INTRAMUSCULAR; INTRAVENOUS EVERY 4 HOURS PRN
Status: DISCONTINUED | OUTPATIENT
Start: 2024-07-01 | End: 2024-07-03 | Stop reason: HOSPADM

## 2024-07-01 RX ORDER — DEXMEDETOMIDINE HYDROCHLORIDE 100 UG/ML
INJECTION, SOLUTION INTRAVENOUS
Status: DISCONTINUED | OUTPATIENT
Start: 2024-07-01 | End: 2024-07-01

## 2024-07-01 RX ORDER — NICARDIPINE HYDROCHLORIDE 0.2 MG/ML
0-15 INJECTION INTRAVENOUS CONTINUOUS
Status: DISCONTINUED | OUTPATIENT
Start: 2024-07-01 | End: 2024-07-02

## 2024-07-01 RX ORDER — ESCITALOPRAM OXALATE 10 MG/1
10 TABLET ORAL DAILY
COMMUNITY

## 2024-07-01 RX ORDER — MUPIROCIN 20 MG/G
OINTMENT TOPICAL
Status: DISCONTINUED | OUTPATIENT
Start: 2024-07-01 | End: 2024-07-01 | Stop reason: HOSPADM

## 2024-07-01 RX ORDER — ONDANSETRON HYDROCHLORIDE 2 MG/ML
INJECTION, SOLUTION INTRAVENOUS
Status: DISCONTINUED | OUTPATIENT
Start: 2024-07-01 | End: 2024-07-01

## 2024-07-01 RX ORDER — ACETAMINOPHEN 325 MG/1
650 TABLET ORAL EVERY 6 HOURS
Status: DISCONTINUED | OUTPATIENT
Start: 2024-07-01 | End: 2024-07-02

## 2024-07-01 RX ORDER — ATORVASTATIN CALCIUM 10 MG/1
10 TABLET, FILM COATED ORAL NIGHTLY
Status: DISCONTINUED | OUTPATIENT
Start: 2024-07-01 | End: 2024-07-03 | Stop reason: HOSPADM

## 2024-07-01 RX ORDER — DEXAMETHASONE SODIUM PHOSPHATE 4 MG/ML
INJECTION, SOLUTION INTRA-ARTICULAR; INTRALESIONAL; INTRAMUSCULAR; INTRAVENOUS; SOFT TISSUE
Status: DISCONTINUED | OUTPATIENT
Start: 2024-07-01 | End: 2024-07-01

## 2024-07-01 RX ORDER — LEVETIRACETAM 500 MG/1
500 TABLET ORAL 2 TIMES DAILY
Status: DISCONTINUED | OUTPATIENT
Start: 2024-07-01 | End: 2024-07-03 | Stop reason: HOSPADM

## 2024-07-01 RX ORDER — DEXAMETHASONE SODIUM PHOSPHATE 4 MG/ML
4 INJECTION, SOLUTION INTRA-ARTICULAR; INTRALESIONAL; INTRAMUSCULAR; INTRAVENOUS; SOFT TISSUE EVERY 6 HOURS
Status: DISCONTINUED | OUTPATIENT
Start: 2024-07-01 | End: 2024-07-03 | Stop reason: HOSPADM

## 2024-07-01 RX ORDER — HYDROCODONE BITARTRATE AND ACETAMINOPHEN 500; 5 MG/1; MG/1
TABLET ORAL
Status: DISCONTINUED | OUTPATIENT
Start: 2024-07-01 | End: 2024-07-01 | Stop reason: HOSPADM

## 2024-07-01 RX ORDER — SODIUM CHLORIDE 9 MG/ML
INJECTION, SOLUTION INTRAVENOUS CONTINUOUS
Status: DISCONTINUED | OUTPATIENT
Start: 2024-07-01 | End: 2024-07-01

## 2024-07-01 RX ORDER — ROCURONIUM BROMIDE 10 MG/ML
INJECTION, SOLUTION INTRAVENOUS
Status: DISCONTINUED | OUTPATIENT
Start: 2024-07-01 | End: 2024-07-01

## 2024-07-01 RX ORDER — BUSPIRONE HYDROCHLORIDE 7.5 MG/1
7.5 TABLET ORAL 2 TIMES DAILY
COMMUNITY
Start: 2024-06-29

## 2024-07-01 RX ORDER — GADOBUTROL 604.72 MG/ML
10 INJECTION INTRAVENOUS
Status: COMPLETED | OUTPATIENT
Start: 2024-07-01 | End: 2024-07-01

## 2024-07-01 RX ORDER — PROPOFOL 10 MG/ML
VIAL (ML) INTRAVENOUS
Status: DISCONTINUED | OUTPATIENT
Start: 2024-07-01 | End: 2024-07-01

## 2024-07-01 RX ORDER — LABETALOL HCL 20 MG/4 ML
10 SYRINGE (ML) INTRAVENOUS EVERY 4 HOURS PRN
Status: DISCONTINUED | OUTPATIENT
Start: 2024-07-01 | End: 2024-07-03 | Stop reason: HOSPADM

## 2024-07-01 RX ORDER — ALPRAZOLAM 0.5 MG/1
0.25 TABLET, EXTENDED RELEASE ORAL 2 TIMES DAILY PRN
COMMUNITY

## 2024-07-01 RX ORDER — MIDAZOLAM HYDROCHLORIDE 1 MG/ML
INJECTION INTRAMUSCULAR; INTRAVENOUS
Status: DISCONTINUED | OUTPATIENT
Start: 2024-07-01 | End: 2024-07-01

## 2024-07-01 RX ORDER — MUPIROCIN 20 MG/G
OINTMENT TOPICAL 2 TIMES DAILY
Status: COMPLETED | OUTPATIENT
Start: 2024-07-01 | End: 2024-07-03

## 2024-07-01 RX ORDER — ACETAMINOPHEN 325 MG/1
650 TABLET ORAL EVERY 6 HOURS PRN
Status: DISCONTINUED | OUTPATIENT
Start: 2024-07-01 | End: 2024-07-03 | Stop reason: HOSPADM

## 2024-07-01 RX ADMIN — OXYCODONE 5 MG: 5 TABLET ORAL at 09:07

## 2024-07-01 RX ADMIN — CEFAZOLIN 2 G: 2 INJECTION, POWDER, FOR SOLUTION INTRAMUSCULAR; INTRAVENOUS at 01:07

## 2024-07-01 RX ADMIN — ROCURONIUM BROMIDE 100 MG: 10 INJECTION, SOLUTION INTRAVENOUS at 07:07

## 2024-07-01 RX ADMIN — VANCOMYCIN HYDROCHLORIDE 1000 MG: 1 INJECTION, POWDER, LYOPHILIZED, FOR SOLUTION INTRAVENOUS at 07:07

## 2024-07-01 RX ADMIN — DEXAMETHASONE SODIUM PHOSPHATE 4 MG: 4 INJECTION INTRA-ARTICULAR; INTRALESIONAL; INTRAMUSCULAR; INTRAVENOUS; SOFT TISSUE at 06:07

## 2024-07-01 RX ADMIN — ACETAMINOPHEN 1000 MG: 500 TABLET ORAL at 06:07

## 2024-07-01 RX ADMIN — MUPIROCIN: 20 OINTMENT TOPICAL at 09:07

## 2024-07-01 RX ADMIN — PROPOFOL 40 MG: 10 INJECTION, EMULSION INTRAVENOUS at 07:07

## 2024-07-01 RX ADMIN — FAMOTIDINE 20 MG: 20 TABLET ORAL at 02:07

## 2024-07-01 RX ADMIN — ESCITALOPRAM OXALATE 10 MG: 10 TABLET ORAL at 02:07

## 2024-07-01 RX ADMIN — SODIUM CHLORIDE: 9 INJECTION, SOLUTION INTRAVENOUS at 06:07

## 2024-07-01 RX ADMIN — Medication 10 MG: at 10:07

## 2024-07-01 RX ADMIN — METHOCARBAMOL 500 MG: 500 TABLET ORAL at 02:07

## 2024-07-01 RX ADMIN — DEXMEDETOMIDINE 8 MCG: 100 INJECTION, SOLUTION, CONCENTRATE INTRAVENOUS at 10:07

## 2024-07-01 RX ADMIN — MUPIROCIN: 20 OINTMENT TOPICAL at 01:07

## 2024-07-01 RX ADMIN — SENNOSIDES AND DOCUSATE SODIUM 2 TABLET: 50; 8.6 TABLET ORAL at 09:07

## 2024-07-01 RX ADMIN — ACETAMINOPHEN 650 MG: 325 TABLET ORAL at 02:07

## 2024-07-01 RX ADMIN — SODIUM CHLORIDE: 9 INJECTION, SOLUTION INTRAVENOUS at 01:07

## 2024-07-01 RX ADMIN — ACETAMINOPHEN 650 MG: 325 TABLET ORAL at 06:07

## 2024-07-01 RX ADMIN — ATORVASTATIN CALCIUM 10 MG: 10 TABLET, FILM COATED ORAL at 09:07

## 2024-07-01 RX ADMIN — OXYCODONE 5 MG: 5 TABLET ORAL at 05:07

## 2024-07-01 RX ADMIN — FAMOTIDINE 20 MG: 20 TABLET ORAL at 09:07

## 2024-07-01 RX ADMIN — METHOCARBAMOL 500 MG: 500 TABLET ORAL at 06:07

## 2024-07-01 RX ADMIN — PROPOFOL 50 MG: 10 INJECTION, EMULSION INTRAVENOUS at 11:07

## 2024-07-01 RX ADMIN — GADOBUTROL 10 ML: 604.72 INJECTION INTRAVENOUS at 04:07

## 2024-07-01 RX ADMIN — ALPRAZOLAM 0.25 MG: 0.25 TABLET ORAL at 09:07

## 2024-07-01 RX ADMIN — MIDAZOLAM HYDROCHLORIDE 2 MG: 1 INJECTION, SOLUTION INTRAMUSCULAR; INTRAVENOUS at 07:07

## 2024-07-01 RX ADMIN — NICARDIPINE HYDROCHLORIDE 2.5 MG/HR: 0.2 INJECTION, SOLUTION INTRAVENOUS at 08:07

## 2024-07-01 RX ADMIN — Medication 10 MG: at 09:07

## 2024-07-01 RX ADMIN — LABETALOL HYDROCHLORIDE 10 MG: 5 INJECTION, SOLUTION INTRAVENOUS at 06:07

## 2024-07-01 RX ADMIN — PROPOFOL 100 MCG/KG/MIN: 10 INJECTION, EMULSION INTRAVENOUS at 07:07

## 2024-07-01 RX ADMIN — ROCURONIUM BROMIDE 5 MG: 10 INJECTION, SOLUTION INTRAVENOUS at 11:07

## 2024-07-01 RX ADMIN — ROCURONIUM BROMIDE 20 MG: 10 INJECTION, SOLUTION INTRAVENOUS at 09:07

## 2024-07-01 RX ADMIN — LEVETIRACETAM 500 MG: 500 TABLET, FILM COATED ORAL at 09:07

## 2024-07-01 RX ADMIN — PROPOFOL 30 MG: 10 INJECTION, EMULSION INTRAVENOUS at 09:07

## 2024-07-01 RX ADMIN — DEXAMETHASONE SODIUM PHOSPHATE 4 MG: 4 INJECTION INTRA-ARTICULAR; INTRALESIONAL; INTRAMUSCULAR; INTRAVENOUS; SOFT TISSUE at 12:07

## 2024-07-01 RX ADMIN — DEXAMETHASONE SODIUM PHOSPHATE 12 MG: 4 INJECTION, SOLUTION INTRAMUSCULAR; INTRAVENOUS at 07:07

## 2024-07-01 RX ADMIN — NICARDIPINE HYDROCHLORIDE 2.5 MG/HR: 0.2 INJECTION, SOLUTION INTRAVENOUS at 09:07

## 2024-07-01 RX ADMIN — METHOCARBAMOL 500 MG: 500 TABLET ORAL at 09:07

## 2024-07-01 RX ADMIN — ROCURONIUM BROMIDE 50 MG: 10 INJECTION, SOLUTION INTRAVENOUS at 07:07

## 2024-07-01 RX ADMIN — SODIUM CHLORIDE, SODIUM GLUCONATE, SODIUM ACETATE, POTASSIUM CHLORIDE, MAGNESIUM CHLORIDE, SODIUM PHOSPHATE, DIBASIC, AND POTASSIUM PHOSPHATE: .53; .5; .37; .037; .03; .012; .00082 INJECTION, SOLUTION INTRAVENOUS at 07:07

## 2024-07-01 RX ADMIN — HYDRALAZINE HYDROCHLORIDE 10 MG: 20 INJECTION, SOLUTION INTRAMUSCULAR; INTRAVENOUS at 07:07

## 2024-07-01 RX ADMIN — FENTANYL CITRATE 100 MCG: 50 INJECTION, SOLUTION INTRAMUSCULAR; INTRAVENOUS at 07:07

## 2024-07-01 RX ADMIN — SODIUM CHLORIDE: 0.9 INJECTION, SOLUTION INTRAVENOUS at 07:07

## 2024-07-01 RX ADMIN — SENNOSIDES AND DOCUSATE SODIUM 2 TABLET: 50; 8.6 TABLET ORAL at 02:07

## 2024-07-01 RX ADMIN — FENTANYL CITRATE 50 MCG: 50 INJECTION, SOLUTION INTRAMUSCULAR; INTRAVENOUS at 12:07

## 2024-07-01 RX ADMIN — PROPOFOL 200 MG: 10 INJECTION, EMULSION INTRAVENOUS at 07:07

## 2024-07-01 RX ADMIN — OXYCODONE 5 MG: 5 TABLET ORAL at 01:07

## 2024-07-01 RX ADMIN — FENTANYL CITRATE 50 MCG: 50 INJECTION, SOLUTION INTRAMUSCULAR; INTRAVENOUS at 08:07

## 2024-07-01 RX ADMIN — HYDRALAZINE HYDROCHLORIDE 10 MG: 20 INJECTION, SOLUTION INTRAMUSCULAR; INTRAVENOUS at 12:07

## 2024-07-01 RX ADMIN — DEXMEDETOMIDINE 20 MCG: 100 INJECTION, SOLUTION, CONCENTRATE INTRAVENOUS at 08:07

## 2024-07-01 RX ADMIN — LEVETIRACETAM 1000 MG: 100 INJECTION, SOLUTION INTRAVENOUS at 07:07

## 2024-07-01 RX ADMIN — ONDANSETRON 4 MG: 2 INJECTION INTRAMUSCULAR; INTRAVENOUS at 11:07

## 2024-07-01 RX ADMIN — POLYETHYLENE GLYCOL 3350 17 G: 17 POWDER, FOR SOLUTION ORAL at 02:07

## 2024-07-01 RX ADMIN — Medication 20 MG: at 07:07

## 2024-07-01 RX ADMIN — CEFAZOLIN 2 G: 2 INJECTION, POWDER, FOR SOLUTION INTRAMUSCULAR; INTRAVENOUS at 09:07

## 2024-07-01 RX ADMIN — LIDOCAINE HYDROCHLORIDE 100 MG: 20 INJECTION, SOLUTION EPIDURAL; INFILTRATION; INTRACAUDAL; PERINEURAL at 07:07

## 2024-07-01 RX ADMIN — LIDOCAINE HYDROCHLORIDE 10 MG: 10 INJECTION, SOLUTION EPIDURAL; INFILTRATION; INTRACAUDAL; PERINEURAL at 06:07

## 2024-07-01 NOTE — ANESTHESIA PROCEDURE NOTES
Arterial    Diagnosis: meningioma    Patient location during procedure: done in OR  Timeout: 7/1/2024 7:15 AM  Procedure end time: 7/1/2024 7:17 AM    Staffing  Authorizing Provider: Javi Ennis MD  Performing Provider: Russell Tovar DO    Staffing  Performed by: Russell Tovar DO  Authorized by: Javi Ennis MD    Anesthesiologist was present at the time of the procedure.    Preanesthetic Checklist  Completed: patient identified, IV checked, site marked, risks and benefits discussed, surgical consent, monitors and equipment checked, pre-op evaluation, timeout performed and anesthesia consent givenArterial  Skin Prep: chlorhexidine gluconate  Orientation: left  Location: radial    Catheter Size: 20 G  Catheter placement by Ultrasound guidance. Heme positive aspiration all ports.   Vessel Caliber: medium, patent, compressibility normal  Needle advanced into vessel with real time Ultrasound guidance.Insertion Attempts: 1  Assessment  Dressing: secured with tape and tegaderm  Patient: Tolerated well

## 2024-07-01 NOTE — PLAN OF CARE
Post Op Check    The patient was seen at bedside following the procedure. The patient was still waking up from anesthesia but grossly at her neurological baseline, pain was reasonably controlled in the post-operative period, and all questions and/or complaints were directly addressed at bedside by the NSGY resident. All post-operative management orders have been placed and the patient was signed out to bedside RN, charge RN and NCC team    Await post op MRI  SBP <140    Kristine Valente MD  Neurosurgery  [facility]

## 2024-07-01 NOTE — PLAN OF CARE
"[facility] Care Plan    POC reviewed with Meghana Colón and spouse at 1800. Pt and spouse verbalized understanding. Questions and concerns addressed. No acute events today. Pt progressing toward goals. MRI was completed. Will continue to monitor. See below and flowsheets for full assessment and VS info.             Is this a stroke patient? no    Neuro:  [place] Coma Scale  Best Eye Response: 3-->(E3) to speech  Best Motor Response: 6-->(M6) obeys commands  Best Verbal Response: 5-->(V5) oriented  [place] Coma Scale Score: 14  Assessment Qualifiers: patient chemically sedated or paralyzed  Pupil PERRLA: yes     24 hr Temp:  [95 °F (35 °C)-98.2 °F (36.8 °C)]     CV:   Rhythm: normal sinus rhythm  BP goals:   SBP < 140  MAP > 65    Resp:           Plan: N/A    GI/:        Last Bowel Movement: 06/29/24       Intake/Output Summary (Last 24 hours) at 7/1/2024 1826  Last data filed at 7/1/2024 1805  Gross per 24 hour   Intake 2156.8 ml   Output 2140 ml   Net 16.8 ml          Labs/Accuchecks:  Recent Labs   Lab 07/01/24  1305   WBC 15.83*   RBC 3.85*   HGB 12.9   HCT 37.7   *      Recent Labs   Lab 07/01/24  1305      K 3.3*   CO2 21*      BUN 12   CREATININE 0.6      Recent Labs   Lab 07/01/24  0821   INR 1.0   APTT 24.1    No results for input(s): "CPK", "CPKMB", "TROPONINI", "MB" in the last 168 hours.    Electrolytes: N/A - electrolytes WDL  Accuchecks: none    Gtts:   0.9% NaCl   Intravenous Continuous 100 mL/hr at 07/01/24 1705 Rate Verify at 07/01/24 1705       LDA/Wounds:      Nurses Note -- 4 Eyes      Is there altered skin present? No    Second RN/Staff Member:  Shun FRIED      Restraints:        WCTM   "

## 2024-07-01 NOTE — OP NOTE
DATE: 7/1/24    PREOP DIAGNOSIS:  Right sphenoid wing meningioma  Cerebral edema and brain compression    POSTOP DIAGNOSIS:  Same as above    OPERATION PERFORMED:  Right frontotemporal craniotomy for resection of sphenoid wing meningioma  Use of neuro navigation  Use of intraoperative microscope with microscopic dissection    SURGEON:  Terry Marie D.O.    ASSISTANT:  Kristine Valente M.D.    LEVEL OF INVOLVEMENT OF ATTENDING:  Full    INDICATION:  65 yo F who was found to have a right lateral sphenoid extradural mass most likely a meningioma.  She recently had an episode of falling/loss of consciousness that could be related to seizure activity (although unwitnessed). She is anxious about the tumor and would like to have it removed.  She is currently stable on Keppra and completed a course of Decadron.  I recommend she continue taking the Keppra and will restart her on Decadron 2 mg b.i.d. until surgery.  Given her history of central thrombocytopenia, she was cleared by Dr. Dorsey prior to surgery.     Consents were obtained and risks, benefits, and alternatives to surgery were discussed.    PROCEDURE IN DETAIL:  The patient was correctly identified and taken to the operating room where the anesthesia team administered general endotracheal anesthesia.  The patient was kept in supine position with a shoulder bump under the right shoulder.  The head was fixed in a Callejas head frame and turned approximately 45° to the left.  The head was registered using neuro navigation and a curvilinear incision was planned just behind the hairline.  The area was prepped and draped in typical sterile fashion.  A time-out was performed and prophylactic IV antibiotics were given as well as Keppra and Decadron.  The incision was infiltrated local anesthetic and incised with 10 blade scalpel down to the skull.  Further dissection was carried out using Bovie cautery.  The temporalis muscle was cut using Bovie cautery down to the root  of the zygoma.  A myocutaneous flap was elevated using a periosteal elevator and held anteriorly with fishhooks.  A bone flap was then turned using an M8 drill bit and craniotome.  The middle meningeal artery was cauterized with bipolar cautery and the sphenoid ridge was drilled flat with the M8 drill bit.  The dura was then cut posterior to the tumor in a C-shaped fashion and elevated until the grayish purple extra-axial tumor was encountered over the sylvian fissure.  The tumor was densely adherent and possibly invading the frontal and temporal lobes.  Exposed tumor was then cauterized causing it to tighten up.  A incision was made and several samples were taken for frozen which returned as meningioma.  Next the Sonopet was used to debulk internally which allowed the tumor to be more mobile allowing for better dissection off of the brain parenchyma.  The microscope was then brought in for microscopic dissection as the tumor was adherent to the sylvian vessels and frontal and temporal lobes.  Care was taken to sharply dissect the tumor capsule off of the sylvian veins and MCA vessels.  The tumor was then removed in piecemeal fashion and rolled anteriorly once it was freed from the sylvian vessels.  The dura was then cut circumferentially around the tumor until a gross total resection was achieved.  Any dural edges were cauterized with bipolar cautery.  Surgicel stamps were placed in the surgical cavity and the area was closed with a suturable dura matrix.  The posterior aspect of the dura matrix was sutured with 4-0 Nurolon and the anterior aspect was tacked up with 4-0 Nurolon to the bone.  The bone flap was then replated with titanium screws and plates.  A subgaleal drain was placed and the temporalis muscle was closed with 2-0 Vicryl.  The incision was also closed with 2-0 Vicryl followed by staples on the skin.    COMPLICATIONS:  None    INCISION:  Right frontal    WOUND CLASS:  Clean    FINDINGS:  Frozen  returned as meningioma.  Tumor was arising from the sphenoid and was densely adherent to the sylvian vessels and nearby frontal and temporal lobes.    DRAIN:  Subgaleal Hemovac    CONDITION:  Stable    PROGNOSIS:  Good

## 2024-07-01 NOTE — BRIEF OP NOTE
[facility] - Surgery ([facility])  Surgery Department  Operative Note    SUMMARY     Date of Procedure: 7/1/2024     Procedure: Procedure(s) (LRB):  CRANIOTOMY, WITH NEOPLASM EXCISION USING COMPUTER-ASSISTED NAVIGATION (Right)     Surgeons and Role:     * Terry Marie, DO - Primary       Kristine Valente MD - Res    Assisting Surgeon: None    Pre-Operative Diagnosis: Meningioma [D32.9]    Post-Operative Diagnosis: Post-Op Diagnosis Codes:     * Meningioma [D32.9]    Anesthesia: General    Operative Findings (including complications, if any): R temporal crani for resection of meningioma, dural patch with cranioplasty; subgaleal HV drain. Closed with staples    Frozen: meningioma    Description of Technical Procedures: see full op note        Estimated Blood Loss (EBL): * No values recorded between 7/1/2024 12:00 AM and 7/1/2024  6:39 AM *           Implants:   Implant Name Type Inv. Item Serial No.  Lot No. LRB No. Used Action   PINS SKULL ADULT [place] - [id number]  PINS SKULL ADULT [place]  [employer] [id number] Right 3 Implanted and Explanted   DURAMATRIX COLLAGEN DURA SUBSTITUTE MATRIX    LINDA [id number] Right 1 Implanted   PLATE BONE BUR HOLE COVER 10MM - [id number]  PLATE BONE BUR HOLE COVER 10MM  LINDA SALES MAEGAN.  Right 1 Implanted   SCREW UN3 AXS SD 1.5X4MM - [id number]  SCREW UN3 AXS SD 1.5X4MM  LINDA [employer]ES MAEGAN.  Right 9 Implanted   PLATE BONE BUR HLE CVR 7MM TAB - [id number]  PLATE BONE BUR HLE CVR 7MM TAB  LINDA [employer]ES [employer]EGAN.  Right 1 Implanted       Specimens:   Specimen (24h ago, onward)       Start     Ordered    07/01/24 1134  Specimen to Pathology, Surgery Neurosurgery  Once        Comments: Pre-op Diagnosis: Meningioma [D32.9]Procedure(s):CRANIOTOMY, WITH NEOPLASM EXCISION USING COMPUTER-ASSISTED NAVIGATION Number of specimens: 2Name of specimens: 1. Right Temporal Tumor- Frozen ( sent)                                   2. 1. Right Temporal Tumor- Permanent     References:    Click here  for ordering Quick Tip   Question Answer Comment   Procedure Type: Neurosurgery    Specimen Class: Known or suspected malignancy    Release to patient Immediate        07/01/24 0181                            Condition: Stable    Disposition: ICU - extubated and stable.    Attestation: Op Note Attestation: The attending physician was present for the entire procedure.

## 2024-07-01 NOTE — TRANSFER OF CARE
"Anesthesia Transfer of Care Note    Patient: Meghana Colón    Procedure(s) Performed: Procedure(s) (LRB):  CRANIOTOMY, WITH NEOPLASM EXCISION USING COMPUTER-ASSISTED NAVIGATION (Right)    Patient location: ICU    Anesthesia Type: general    Transport from OR: Transported from OR on 6-10 L/min O2 by face mask with adequate spontaneous ventilation. Continuos invasive BP monitoring in transport. Continuous SpO2 monitoring in transport. Continuous ECG monitoring in transport    Post pain: adequate analgesia    Post assessment: no apparent anesthetic complications    Post vital signs: stable    Level of consciousness: awake, alert and oriented    Nausea/Vomiting: no nausea/vomiting    Complications: none    Transfer of care protocol was followed    Last vitals: Visit Vitals  BP (!) 158/100 (BP Location: Left arm, Patient Position: Lying)   Pulse 76   Temp 36.8 °C (98.2 °F) (Temporal)   Resp 18   Ht 5' 11" (1.803 m)   Wt 93.8 kg (206 lb 12.7 oz)   SpO2 97%   Breastfeeding No   BMI 28.84 kg/m²     "

## 2024-07-01 NOTE — HPI
65 y/o F w/ PMH of HTN, HLD, central thrombocytopenia and anxiety. Per chart review, patient had an unwitnessed fall w/ loss of consciousness and possible seizure activity on 4/11. She was seen in the ED where MRI brain revealed a R middle fossa/temporal extradural mass. She was started on Decadron and Keppra and referred to NSGY as an outpatient for further evaluation. She is now s/p R frontotemporal craniotomy for resection of sphenoid wing meningioma and will be admitted to [facility] for post-op monitoring.

## 2024-07-01 NOTE — SUBJECTIVE & OBJECTIVE
Past Medical History:   Diagnosis Date    Hyperlipidemia     Hypertension      Past Surgical History:   Procedure Laterality Date    BONE MARROW BIOPSY      BREAST BIOPSY      COLONOSCOPY  2016    HYSTERECTOMY      THERAPEUTIC         No current facility-administered medications on file prior to encounter.     Current Outpatient Medications on File Prior to Encounter   Medication Sig Dispense Refill    amLODIPine (NORVASC) 10 MG tablet Take 1 tablet (10 mg total) by mouth once daily. 90 tablet 0    atorvastatin (LIPITOR) 20 MG tablet TAKE 1 TABLET BY MOUTH EVERY DAY (Patient taking differently: Take 10 mg by mouth once daily.) 90 tablet 1    busPIRone (BUSPAR) 7.5 MG tablet Take 7.5 mg by mouth 2 (two) times daily.      dexAMETHasone (DECADRON) 2 MG tablet Take 1 tablet (2 mg total) by mouth every 12 (twelve) hours. for 14 days 28 tablet 0    levETIRAcetam (KEPPRA) 500 MG Tab 500 mg 2 (two) times daily.      [DISCONTINUED] hydroCHLOROthiazide (HYDRODIURIL) 25 MG tablet TAKE 1 TABLET BY MOUTH EVERY DAY (Patient taking differently: Take 25 mg by mouth once daily.) 90 tablet 0    ALPRAZolam (XANAX XR) 0.5 MG Tb24 Take 0.25 mg by mouth 2 (two) times daily as needed.      EScitalopram oxalate (LEXAPRO) 10 MG tablet Take 10 mg by mouth once daily.      [DISCONTINUED] aspirin (ECOTRIN) 81 MG EC tablet Take 81 mg by mouth once daily. (Patient not taking: Reported on 2024)      [DISCONTINUED] ergocalciferol (ERGOCALCIFEROL) 50,000 unit Cap TAKE 1 CAPSULE BY MOUTH ONE TIME PER WEEK (Patient not taking: Reported on 2024) 12 capsule 0      Allergies: Penicillin  Family History   Problem Relation Name Age of Onset    Cancer Mother      Alzheimer's disease Mother      Heart disease Mother      Cancer Father      Hypertension Sister      Diabetes Sister      Cancer Sister          breast    Cancer Brother      Hypertension Brother      Heart disease Brother      Heart attack Brother       Social History      Tobacco Use    Smoking status: Former     Types: Cigarettes    Smokeless tobacco: Never   Substance Use Topics    Alcohol use: Not Currently    Drug use: Not Currently     Types: Marijuana     Review of Systems   Respiratory:  Negative for cough and shortness of breath.    Cardiovascular:  Negative for chest pain.   Gastrointestinal:  Negative for abdominal pain, nausea and vomiting.   Neurological:  Positive for headaches. Negative for weakness.     Objective:     Vitals:    Temp: 98 °F (36.7 °C)  Pulse: 72  Rhythm: normal sinus rhythm  BP: (!) 149/95  MAP (mmHg): 116  Resp: 18  SpO2: 95 %    Temp  Min: 95 °F (35 °C)  Max: 98.2 °F (36.8 °C)  Pulse  Min: 69  Max: 86  BP  Min: 128/72  Max: 158/100  MAP (mmHg)  Min: 95  Max: 123  Resp  Min: 18  Max: 33  SpO2  Min: 92 %  Max: 98 %    No intake/output data recorded.            Physical Exam  Vitals and nursing note reviewed.   HENT:      Head: Normocephalic and atraumatic.   Eyes:      Extraocular Movements: Extraocular movements intact.      Pupils: Pupils are equal, round, and reactive to light.   Cardiovascular:      Rate and Rhythm: Normal rate and regular rhythm.      Pulses: Normal pulses.   Pulmonary:      Effort: Pulmonary effort is normal.   Abdominal:      Palpations: Abdomen is soft.   Musculoskeletal:         General: Normal range of motion.      Cervical back: Normal range of motion.   Skin:     General: Skin is warm and dry.      Capillary Refill: Capillary refill takes less than 2 seconds.   Neurological:      Mental Status: She is alert and oriented to person, place, and time.      GCS: GCS eye subscore is 3. GCS verbal subscore is 5. GCS motor subscore is 6.      Cranial Nerves: Cranial nerves 2-12 are intact.      Sensory: Sensation is intact.      Motor: Motor function is intact.      Coordination: Coordination is intact.      Comments:   -E3 V5 M6  -PERRL  -Oriented to person, place, time, and situation  -Follows commands w/ all extremities  -BARKER  spontaneously/purposefully  -RUE 5/5  -LUE 5/5  -RLE 5/5  -LLE 5/5  -Gait deferred            Today I personally reviewed pertinent medications, lines/drains/airways, cardiology results, laboratory results, notably: CBC; CMP

## 2024-07-01 NOTE — CARE UPDATE
Patient arrived to [facility] from OR by ICU bed.       Report received from:  DO Guy    Type of stroke/diagnosis:  meningioma resection    Current symptoms: Lethargic and follows commands    Skin Assessment done: Yes    Wounds noted: craniotomy incision     *If wounds noted, was Wound Care consulted? No    *If wounds noted, LDA placed? Y/N  Skin Assessment Verified by:  Shun Abdi Completed? No    Patient Belongings on Admit: NONE    NCC notified: Kristine Clark NP

## 2024-07-01 NOTE — ANESTHESIA POSTPROCEDURE EVALUATION
Anesthesia Post Evaluation    Patient: Meghana Colón    Procedure(s) Performed: Procedure(s) (LRB):  CRANIOTOMY, WITH NEOPLASM EXCISION USING COMPUTER-ASSISTED NAVIGATION (Right)    Final Anesthesia Type: general      Patient location during evaluation: ICU  Patient participation: Yes- Able to Participate  Level of consciousness: awake  Post-procedure vital signs: reviewed and stable  Pain management: adequate  Airway patency: patent    PONV status at discharge: No PONV  Anesthetic complications: no      Cardiovascular status: hemodynamically stable  Follow-up not needed.              Vitals Value Taken Time   /83 07/01/24 1346   Temp 35 °C (95 °F) 07/01/24 1230   Pulse 70 07/01/24 1347   Resp 18 07/01/24 1305   SpO2 96 % 07/01/24 1347   Vitals shown include unfiled device data.      No case tracking events are documented in the log.      Pain/Emerson Score: Pain Rating Prior to Med Admin: 6 (7/1/2024  1:01 PM)

## 2024-07-01 NOTE — INTERVAL H&P NOTE
The patient has been examined and the H&P has been reviewed:    I concur with the findings and no changes have occurred since H&P was written. Endorses speech difficulty since being on Keppra/Dex. Site marked. Consent signed. Preop labs pending.     Surgery risks, benefits and alternative options discussed and understood by patient/family.          There are no hospital problems to display for this patient.

## 2024-07-01 NOTE — ANESTHESIA PROCEDURE NOTES
Intubation    Date/Time: 7/1/2024 7:11 AM    Performed by: Russell Tovar DO  Authorized by: Javi Ennis MD    Intubation:     Induction:  Intravenous    Intubated:  Postinduction    Mask Ventilation:  Easy with oral airway    Attempts:  1    Attempted By:  Resident anesthesiologist    Method of Intubation:  Direct    Blade:  Johnny 3    Laryngeal View Grade: Grade IIA - cords partially seen      Difficult Airway Encountered?: No      Complications:  None    Airway Device:  Oral endotracheal tube    Airway Device Size:  7.0    Style/Cuff Inflation:  Cuffed (inflated to minimal occlusive pressure)    Tube secured:  24    Secured at:  The lips    Placement Verified By:  Capnometry    Complicating Factors:  Obesity and short neck    Findings Post-Intubation:  BS equal bilateral and atraumatic/condition of teeth unchanged

## 2024-07-01 NOTE — PROGRESS NOTES
Pt stated she did not take her home PO decadron and Keppra. Anesthesia and Dr. Valente notified. Dr. Valente advised that pt does not need to take PO and that it will be given IV.

## 2024-07-02 LAB
ANION GAP SERPL CALC-SCNC: 11 MMOL/L (ref 8–16)
BASOPHILS # BLD AUTO: 0.03 K/UL (ref 0–0.2)
BASOPHILS NFR BLD: 0.2 % (ref 0–1.9)
BUN SERPL-MCNC: 11 MG/DL (ref 8–23)
CALCIUM SERPL-MCNC: 8.5 MG/DL (ref 8.7–10.5)
CHLORIDE SERPL-SCNC: 107 MMOL/L (ref 95–110)
CO2 SERPL-SCNC: 21 MMOL/L (ref 23–29)
CREAT SERPL-MCNC: 0.6 MG/DL (ref 0.5–1.4)
DIFFERENTIAL METHOD BLD: ABNORMAL
EOSINOPHIL # BLD AUTO: 0 K/UL (ref 0–0.5)
EOSINOPHIL NFR BLD: 0 % (ref 0–8)
ERYTHROCYTE [DISTWIDTH] IN BLOOD BY AUTOMATED COUNT: 16 % (ref 11.5–14.5)
EST. GFR  (NO RACE VARIABLE): >60 ML/MIN/1.73 M^2
GLUCOSE SERPL-MCNC: 120 MG/DL (ref 70–110)
HCT VFR BLD AUTO: 38.3 % (ref 37–48.5)
HGB BLD-MCNC: 12.8 G/DL (ref 12–16)
IMM GRANULOCYTES # BLD AUTO: 0.5 K/UL (ref 0–0.04)
IMM GRANULOCYTES NFR BLD AUTO: 2.8 % (ref 0–0.5)
LYMPHOCYTES # BLD AUTO: 0.8 K/UL (ref 1–4.8)
LYMPHOCYTES NFR BLD: 4.3 % (ref 18–48)
MAGNESIUM SERPL-MCNC: 2 MG/DL (ref 1.6–2.6)
MCH RBC QN AUTO: 33.3 PG (ref 27–31)
MCHC RBC AUTO-ENTMCNC: 33.4 G/DL (ref 32–36)
MCV RBC AUTO: 100 FL (ref 82–98)
MONOCYTES # BLD AUTO: 1 K/UL (ref 0.3–1)
MONOCYTES NFR BLD: 5.7 % (ref 4–15)
NEUTROPHILS # BLD AUTO: 15.5 K/UL (ref 1.8–7.7)
NEUTROPHILS NFR BLD: 87 % (ref 38–73)
NRBC BLD-RTO: 0 /100 WBC
PHOSPHATE SERPL-MCNC: 3.3 MG/DL (ref 2.7–4.5)
PLATELET # BLD AUTO: 542 K/UL (ref 150–450)
PMV BLD AUTO: 10 FL (ref 9.2–12.9)
POTASSIUM SERPL-SCNC: 4.1 MMOL/L (ref 3.5–5.1)
RBC # BLD AUTO: 3.84 M/UL (ref 4–5.4)
SODIUM SERPL-SCNC: 139 MMOL/L (ref 136–145)
WBC # BLD AUTO: 17.78 K/UL (ref 3.9–12.7)

## 2024-07-02 PROCEDURE — 85025 COMPLETE CBC W/AUTO DIFF WBC: CPT | Performed by: PSYCHIATRY & NEUROLOGY

## 2024-07-02 PROCEDURE — 84100 ASSAY OF PHOSPHORUS: CPT | Performed by: PSYCHIATRY & NEUROLOGY

## 2024-07-02 PROCEDURE — 63600175 PHARM REV CODE 636 W HCPCS: Performed by: STUDENT IN AN ORGANIZED HEALTH CARE EDUCATION/TRAINING PROGRAM

## 2024-07-02 PROCEDURE — 25000003 PHARM REV CODE 250: Performed by: REGISTERED NURSE

## 2024-07-02 PROCEDURE — 99233 SBSQ HOSP IP/OBS HIGH 50: CPT | Mod: FS,,, | Performed by: PSYCHIATRY & NEUROLOGY

## 2024-07-02 PROCEDURE — 63600175 PHARM REV CODE 636 W HCPCS: Performed by: REGISTERED NURSE

## 2024-07-02 PROCEDURE — 97535 SELF CARE MNGMENT TRAINING: CPT

## 2024-07-02 PROCEDURE — 11000001 HC ACUTE MED/SURG PRIVATE ROOM

## 2024-07-02 PROCEDURE — 25000003 PHARM REV CODE 250: Performed by: STUDENT IN AN ORGANIZED HEALTH CARE EDUCATION/TRAINING PROGRAM

## 2024-07-02 PROCEDURE — 97161 PT EVAL LOW COMPLEX 20 MIN: CPT

## 2024-07-02 PROCEDURE — 97530 THERAPEUTIC ACTIVITIES: CPT

## 2024-07-02 PROCEDURE — 97165 OT EVAL LOW COMPLEX 30 MIN: CPT

## 2024-07-02 PROCEDURE — 80048 BASIC METABOLIC PNL TOTAL CA: CPT | Performed by: PSYCHIATRY & NEUROLOGY

## 2024-07-02 PROCEDURE — 99499 UNLISTED E&M SERVICE: CPT | Mod: ,,, | Performed by: PHYSICIAN ASSISTANT

## 2024-07-02 PROCEDURE — 83735 ASSAY OF MAGNESIUM: CPT | Performed by: PSYCHIATRY & NEUROLOGY

## 2024-07-02 PROCEDURE — 63600175 PHARM REV CODE 636 W HCPCS: Performed by: NURSE PRACTITIONER

## 2024-07-02 RX ADMIN — ATORVASTATIN CALCIUM 10 MG: 10 TABLET, FILM COATED ORAL at 08:07

## 2024-07-02 RX ADMIN — DEXAMETHASONE SODIUM PHOSPHATE 4 MG: 4 INJECTION INTRA-ARTICULAR; INTRALESIONAL; INTRAMUSCULAR; INTRAVENOUS; SOFT TISSUE at 12:07

## 2024-07-02 RX ADMIN — OXYCODONE 5 MG: 5 TABLET ORAL at 12:07

## 2024-07-02 RX ADMIN — DEXAMETHASONE SODIUM PHOSPHATE 4 MG: 4 INJECTION INTRA-ARTICULAR; INTRALESIONAL; INTRAMUSCULAR; INTRAVENOUS; SOFT TISSUE at 05:07

## 2024-07-02 RX ADMIN — SENNOSIDES AND DOCUSATE SODIUM 2 TABLET: 50; 8.6 TABLET ORAL at 08:07

## 2024-07-02 RX ADMIN — OXYCODONE 5 MG: 5 TABLET ORAL at 08:07

## 2024-07-02 RX ADMIN — MUPIROCIN: 20 OINTMENT TOPICAL at 09:07

## 2024-07-02 RX ADMIN — ESCITALOPRAM OXALATE 10 MG: 10 TABLET ORAL at 08:07

## 2024-07-02 RX ADMIN — CEFAZOLIN 2 G: 2 INJECTION, POWDER, FOR SOLUTION INTRAMUSCULAR; INTRAVENOUS at 10:07

## 2024-07-02 RX ADMIN — CEFAZOLIN 2 G: 2 INJECTION, POWDER, FOR SOLUTION INTRAMUSCULAR; INTRAVENOUS at 05:07

## 2024-07-02 RX ADMIN — FAMOTIDINE 20 MG: 20 TABLET ORAL at 08:07

## 2024-07-02 RX ADMIN — METHOCARBAMOL 500 MG: 500 TABLET ORAL at 08:07

## 2024-07-02 RX ADMIN — METHOCARBAMOL 500 MG: 500 TABLET ORAL at 12:07

## 2024-07-02 RX ADMIN — NICARDIPINE HYDROCHLORIDE 7.5 MG/HR: 0.2 INJECTION, SOLUTION INTRAVENOUS at 04:07

## 2024-07-02 RX ADMIN — HYDRALAZINE HYDROCHLORIDE 10 MG: 20 INJECTION, SOLUTION INTRAMUSCULAR; INTRAVENOUS at 10:07

## 2024-07-02 RX ADMIN — ACETAMINOPHEN 650 MG: 325 TABLET ORAL at 12:07

## 2024-07-02 RX ADMIN — METHOCARBAMOL 500 MG: 500 TABLET ORAL at 05:07

## 2024-07-02 RX ADMIN — ACETAMINOPHEN 650 MG: 325 TABLET ORAL at 05:07

## 2024-07-02 RX ADMIN — POLYETHYLENE GLYCOL 3350 17 G: 17 POWDER, FOR SOLUTION ORAL at 08:07

## 2024-07-02 RX ADMIN — OXYCODONE HYDROCHLORIDE 10 MG: 10 TABLET ORAL at 08:07

## 2024-07-02 RX ADMIN — AMLODIPINE BESYLATE 10 MG: 10 TABLET ORAL at 08:07

## 2024-07-02 RX ADMIN — OXYCODONE HYDROCHLORIDE 10 MG: 10 TABLET ORAL at 05:07

## 2024-07-02 RX ADMIN — LEVETIRACETAM 500 MG: 500 TABLET, FILM COATED ORAL at 08:07

## 2024-07-02 RX ADMIN — DEXAMETHASONE SODIUM PHOSPHATE 4 MG: 4 INJECTION INTRA-ARTICULAR; INTRALESIONAL; INTRAMUSCULAR; INTRAVENOUS; SOFT TISSUE at 11:07

## 2024-07-02 RX ADMIN — CEFAZOLIN 2 G: 2 INJECTION, POWDER, FOR SOLUTION INTRAMUSCULAR; INTRAVENOUS at 02:07

## 2024-07-02 NOTE — NURSING
Patient Transferred to NPU Room 921       Upon arrival to the floor, patient greeted and oriented to room. Complete head to toe assessment completed per protocol. VSS, see flowsheet for details. Neuro assessment completed. Primary team notified of patient's transfer to floor. All current and transfer orders reviewed/reconciled per protocol. All emergency equipment set up in patient's room. Fall/seizure precautions initiated per providers orders. 4 Eyes skin assessment performed, see below for details. Reviewed assessment and rounding frequency with patient and family. Questions were encouraged and addressed. Repositioned patient for comfort with bed locked in lowest position, side rails up x 2, bed alarm set, and call light within reach. Instructed patient to call staff for mobility/assistance, verbalized understanding. No acute signs of distress noted at this time.       Nurses Note -- 4 Eyes      7/2/2024   6:34 PM      Skin assessed during: Transfer      [x] No Altered Skin Integrity Present    [x]Prevention Measures Documented      [] Yes- Altered Skin Integrity Present or Discovered   [] LDA Added if Not in Epic (Describe Wound)   [] New Altered Skin Integrity was Present on Admit and Documented in LDA   [] Wound Image Taken    Wound Care Consulted? No    Attending Nurse:  Mariah Cole RN/Staff Member:  FARIHA Wild

## 2024-07-02 NOTE — PROGRESS NOTES
[facility]  Neurocritical Care  Progress Note    Admit Date: 7/1/2024  Service Date: 07/02/2024  Length of Stay: 1    Subjective:     Chief Complaint: Meningioma    History of Present Illness: 65 y/o F w/ PMH of HTN, HLD, central thrombocytopenia and anxiety. Per chart review, patient had an unwitnessed fall w/ loss of consciousness and possible seizure activity on 4/11. She was seen in the ED where MRI brain revealed a R middle fossa/temporal extradural mass. She was started on Decadron and Keppra and referred to [facility] as an outpatient for further evaluation. She is now s/p R frontotemporal craniotomy for resection of sphenoid wing meningioma and will be admitted to [facility] for post-op monitoring.     Hospital Course: 07/02/2024 NAEO, post op MRI reviewed. Transfer to [facility], discussed with team.    Interval History: See hospital course.     Review of Systems: Review of Systems   Constitutional:  Negative for chills and fever.   Eyes:  Negative for blurred vision and double vision.   Respiratory:  Negative for cough and shortness of breath.    Cardiovascular:  Negative for chest pain and palpitations.   Gastrointestinal:  Negative for nausea and vomiting.   Genitourinary:  Negative for frequency and urgency.   Musculoskeletal:  Negative for back pain and neck pain.   Skin:  Negative for itching and rash.   Neurological:  Negative for sensory change, speech change, focal weakness and headaches.   Psychiatric/Behavioral:  The patient is nervous/anxious.          Vitals:   Temp: 97.9 °F (36.6 °C)  Pulse: 63  Rhythm: normal sinus rhythm  BP: 125/85  MAP (mmHg): 100  Resp: 19  SpO2: (!) 93 %    Temp  Min: 97.8 °F (36.6 °C)  Max: 98 °F (36.7 °C)  Pulse  Min: 63  Max: 86  BP  Min: 121/72  Max: 154/89  MAP (mmHg)  Min: 90  Max: 117  Resp  Min: 18  Max: 33  SpO2  Min: 93 %  Max: 98 %    07/01 0701 - 07/02 0700  In: 3724.8 [P.O.:100; I.V.:2237]  Out: 3270 [Urine:3075; Drains:195]   Unmeasured Output  Stool  "Occurrence: 0     Examination:   Constitutional: Well-nourished and -developed. No apparent distress.   Eyes: Conjunctiva clear, anicteric. Lids no lesions.  Head/Ears/Nose/Mouth/Throat/Neck: Moist mucous membranes. External ears, nose atraumatic. Incision CDI.  Cardiovascular: Regular rhythm. No murmurs. No leg edema.  Respiratory: Comfortable respirations. Clear to auscultation.  Gastrointestinal: No hernia. Soft, nondistended, nontender. + bowel sounds.    Neurologic:  -GCS E4V5M6  -Alert. Oriented to person, place, and time. Speech fluent. Follows commands.  -Cranial nerves II-XII intact, particularly   -Motor BARKER, equal strength throughout   -Sensation intact      Medications:   Continuous ScheduledamLODIPine, 10 mg, Daily  atorvastatin, 10 mg, QHS  ceFAZolin (Ancef) IV (PEDS and ADULTS), 2 g, Q8H  dexAMETHasone, 4 mg, Q6H  EScitalopram oxalate, 10 mg, Daily  famotidine, 20 mg, BID  levETIRAcetam, 500 mg, BID  methocarbamoL, 500 mg, QID  mupirocin, , BID  polyethylene glycol, 17 g, Daily  senna-docusate 8.6-50 mg, 2 tablet, BID    PRNacetaminophen, 650 mg, Q6H PRN  hydrALAZINE, 10 mg, Q4H PRN  labetalol, 10 mg, Q4H PRN  ondansetron, 4 mg, Q12H PRN  oxyCODONE, 5 mg, Q4H PRN  oxyCODONE, 10 mg, Q6H PRN       Today I independently reviewed pertinent medications, lines/drains/airways, imaging, cardiology results, laboratory results, microbiology results,     ISTAT: No results for input(s): "PH", "PCO2", "PO2", "POCSATURATED", "HCO3", "BE", "POCNA", "POCK", "POCTCO2", "POCGLU", "POCICA", "POCLAC", "SAMPLE" in the last 24 hours.   Chem:   Recent Labs   Lab 07/02/24  0003      K 4.1      CO2 21*   *   BUN 11   CREATININE 0.6   CALCIUM 8.5*   MG 2.0   PHOS 3.3   ANIONGAP 11     Heme:   Recent Labs   Lab 07/02/24  0003   WBC 17.78*   HGB 12.8   HCT 38.3   *     Endo:   Recent Labs   Lab 07/01/24  1306   POCTGLUCOSE 149*          Assessment/Plan:     Psychiatric  Generalized anxiety " disorder  -Home Lexapro resumed    Cardiac/Vascular  Hyperlipidemia  -Atorvastatin daily    Benign essential hypertension  -SBP goal < 140  -Home amlodipine resumed  -PRN labetalol/hydralazine    Oncology  * Meningioma  63 y/o F s/p R frontotemporal craniotomy for resection of sphenoid wing meningioma.    -Admit to NCC  -NSGY following  -VS/Neuro checks q1  -Subgaleal HV drain; ancef for drain PPX  -HOB >/= 30  -Keppra BID  -Dex 4 q6  -Famotidine BID while on dex  -Multimodal pain regimen  -SBP goal < 140  -Regular diet once passes Abdi; d/c IVF when tolerating diet  -Monitor I/O  -CBC, CMP, Mag, Phos daily  -SCDs for DVT PPX; start chemical PPX when appropriate  -PT/OT    Thrombocytosis  -CBC daily  -stable          The patient is being Prophylaxed for:  Venous Thromboembolism with: Mechanical  Stress Ulcer with: H2B  Ventilator Pneumonia with: not applicable    Activity Orders            Discontinue arterial line starting at 07/02 0951    Diet Adult Regular: Regular starting at 07/01 1153          Full Code    FANNY GambinoC  Neurocritical Care  [facility]

## 2024-07-02 NOTE — PLAN OF CARE
"[facility] Care Plan    POC reviewed with Meghana Colón and family at 0300. Pt verbalized understanding. Questions and concerns addressed. No acute events overnight. Pt progressing toward goals. Will continue to monitor. See below and flowsheets for full assessment and VS info.     -NS @ 100  -Cardene titrated       Is this a stroke patient? no    Neuro:  [place] Coma Scale  Best Eye Response: 4-->(E4) spontaneous  Best Motor Response: 6-->(M6) obeys commands  Best Verbal Response: 5-->(V5) oriented  [place] Coma Scale Score: 15  Assessment Qualifiers: patient not sedated/intubated  Pupil PERRLA: yes     24hr Temp:  [95 °F (35 °C)-98.2 °F (36.8 °C)]     CV:   Rhythm: normal sinus rhythm  BP goals:   SBP < 140  MAP > 65    Resp:           Plan: N/A    GI/:     Diet/Nutrition Received: regular  Last Bowel Movement: 06/29/24  Voiding Characteristics: urethral catheter (bladder)    Intake/Output Summary (Last 24 hours) at 7/2/2024 0323  Last data filed at 7/2/2024 0305  Gross per 24 hour   Intake 3281.37 ml   Output 2390 ml   Net 891.37 ml     Unmeasured Output  Stool Occurrence: 0    Labs/Accuchecks:  Recent Labs   Lab 07/02/24  0003   WBC 17.78*   RBC 3.84*   HGB 12.8   HCT 38.3   *      Recent Labs   Lab 07/02/24  0003      K 4.1   CO2 21*      BUN 11   CREATININE 0.6      Recent Labs   Lab 07/01/24  0821   INR 1.0   APTT 24.1    No results for input(s): "CPK", "CPKMB", "TROPONINI", "MB" in the last 168 hours.    Electrolytes: N/A - electrolytes WDL  Accuchecks: none    Gtts:   nicardipine  0-15 mg/hr Intravenous Continuous 37.5 mL/hr at 07/02/24 0305 7.5 mg/hr at 07/02/24 0305       LDA/Wounds:    Nurses Note -- 4 Eyes    Is there altered skin present? no       Prevention Measures Documented    Second RN/Staff Member:  FARIHA Dejesus     Restraints:        WCTM   "

## 2024-07-02 NOTE — H&P
[facility]  Neurocritical Care  History & Physical    Admit Date: 2024  Service Date: 2024  Length of Stay: 0    Subjective:     Chief Complaint: Meningioma    History of Present Illness: 63 y/o F w/ PMH of HTN, HLD, central thrombocytopenia and anxiety. Per chart review, patient had an unwitnessed fall w/ loss of consciousness and possible seizure activity on . She was seen in the ED where MRI brain revealed a R middle fossa/temporal extradural mass. She was started on Decadron and Keppra and referred to NSGY as an outpatient for further evaluation. She is now s/p R frontotemporal craniotomy for resection of sphenoid wing meningioma and will be admitted to [facility] for post-op monitoring.       Past Medical History:   Diagnosis Date    Hyperlipidemia     Hypertension      Past Surgical History:   Procedure Laterality Date    BONE MARROW BIOPSY      BREAST BIOPSY      COLONOSCOPY  2016    HYSTERECTOMY      THERAPEUTIC         No current facility-administered medications on file prior to encounter.     Current Outpatient Medications on File Prior to Encounter   Medication Sig Dispense Refill    amLODIPine (NORVASC) 10 MG tablet Take 1 tablet (10 mg total) by mouth once daily. 90 tablet 0    atorvastatin (LIPITOR) 20 MG tablet TAKE 1 TABLET BY MOUTH EVERY DAY (Patient taking differently: Take 10 mg by mouth once daily.) 90 tablet 1    busPIRone (BUSPAR) 7.5 MG tablet Take 7.5 mg by mouth 2 (two) times daily.      dexAMETHasone (DECADRON) 2 MG tablet Take 1 tablet (2 mg total) by mouth every 12 (twelve) hours. for 14 days 28 tablet 0    levETIRAcetam (KEPPRA) 500 MG Tab 500 mg 2 (two) times daily.      [DISCONTINUED] hydroCHLOROthiazide (HYDRODIURIL) 25 MG tablet TAKE 1 TABLET BY MOUTH EVERY DAY (Patient taking differently: Take 25 mg by mouth once daily.) 90 tablet 0    ALPRAZolam (XANAX XR) 0.5 MG Tb24 Take 0.25 mg by mouth 2 (two) times daily as needed.      EScitalopram  oxalate (LEXAPRO) 10 MG tablet Take 10 mg by mouth once daily.      [DISCONTINUED] aspirin (ECOTRIN) 81 MG EC tablet Take 81 mg by mouth once daily. (Patient not taking: Reported on 6/24/2024)      [DISCONTINUED] ergocalciferol (ERGOCALCIFEROL) 50,000 unit Cap TAKE 1 CAPSULE BY MOUTH ONE TIME PER WEEK (Patient not taking: Reported on 6/24/2024) 12 capsule 0      Allergies: Penicillin  Family History   Problem Relation Name Age of Onset    Cancer Mother      Alzheimer's disease Mother      Heart disease Mother      Cancer Father      Hypertension Sister      Diabetes Sister      Cancer Sister          breast    Cancer Brother      Hypertension Brother      Heart disease Brother      Heart attack Brother       Social History     Tobacco Use    Smoking status: Former     Types: Cigarettes    Smokeless tobacco: Never   Substance Use Topics    Alcohol use: Not Currently    Drug use: Not Currently     Types: Marijuana     Review of Systems   Respiratory:  Negative for cough and shortness of breath.    Cardiovascular:  Negative for chest pain.   Gastrointestinal:  Negative for abdominal pain, nausea and vomiting.   Neurological:  Positive for headaches. Negative for weakness.     Objective:     Vitals:    Temp: 98 °F (36.7 °C)  Pulse: 72  Rhythm: normal sinus rhythm  BP: (!) 149/95  MAP (mmHg): 116  Resp: 18  SpO2: 95 %    Temp  Min: 95 °F (35 °C)  Max: 98.2 °F (36.8 °C)  Pulse  Min: 69  Max: 86  BP  Min: 128/72  Max: 158/100  MAP (mmHg)  Min: 95  Max: 123  Resp  Min: 18  Max: 33  SpO2  Min: 92 %  Max: 98 %    No intake/output data recorded.            Physical Exam  Vitals and nursing note reviewed.   HENT:      Head: Normocephalic and atraumatic.   Eyes:      Extraocular Movements: Extraocular movements intact.      Pupils: Pupils are equal, round, and reactive to light.   Cardiovascular:      Rate and Rhythm: Normal rate and regular rhythm.      Pulses: Normal pulses.   Pulmonary:      Effort: Pulmonary effort is  normal.   Abdominal:      Palpations: Abdomen is soft.   Musculoskeletal:         General: Normal range of motion.      Cervical back: Normal range of motion.   Skin:     General: Skin is warm and dry.      Capillary Refill: Capillary refill takes less than 2 seconds.   Neurological:      Mental Status: She is alert and oriented to person, place, and time.      GCS: GCS eye subscore is 3. GCS verbal subscore is 5. GCS motor subscore is 6.      Cranial Nerves: Cranial nerves 2-12 are intact.      Sensory: Sensation is intact.      Motor: Motor function is intact.      Coordination: Coordination is intact.      Comments:   -E3 V5 M6  -PERRL  -Oriented to person, place, time, and situation  -Follows commands w/ all extremities  -BARKER spontaneously/purposefully  -RUE 5/5  -LUE 5/5  -RLE 5/5  -LLE 5/5  -Gait deferred            Today I personally reviewed pertinent medications, lines/drains/airways, cardiology results, laboratory results, notably: CBC; CMP    Assessment/Plan:     Psychiatric  Generalized anxiety disorder  -Home Lexapro resumed    Cardiac/Vascular  Hyperlipidemia  -Atorvastatin daily    Benign essential hypertension  -SBP goal < 140  -Home amlodipine resumed  -PRN labetalol/hydralazine    Oncology  * Meningioma  63 y/o F s/p R frontotemporal craniotomy for resection of sphenoid wing meningioma.    -Admit to NCC  -NSGY following  -VS/Neuro checks q1  -Subgaleal HV drain; ancef for drain PPX  -HOB >/= 30  -Keppra BID  -Dex 4 q6  -Famotidine BID while on dex  -Multimodal pain regimen  -SBP goal < 140  -Regular diet once passes Abdi; d/c IVF when tolerating diet  -Monitor I/O  -CBC, CMP, Mag, Phos daily  -SCDs for DVT PPX; start chemical PPX when appropriate  -PT/OT    Thrombocytosis  -CBC daily      The patient is being Prophylaxed for:  Venous Thromboembolism with: Mechanical  Stress Ulcer with: H2B  Ventilator Pneumonia with: not applicable    Activity Orders            Bed rest starting at 07/01 1153     Diet Adult Regular: Regular starting at 07/01 1153          Full Code    Critical care time spent on the evaluation and treatment of severe organ dysfunction, review of pertinent labs and imaging studies, discussions with consulting providers and discussions with patient/family: 35 minutes    Kristine Clark NP  Neurocritical Care  [facility]

## 2024-07-02 NOTE — NURSING
Nursing Transfer Note       Transfer To     Transfer via wheelchair    Transfer with cardiac monitoring    Transported by FARIHA Anthony    Medicines sent: Yes    Chart sent with patient: Yes    Belongings sent with patient: phone earrings.    Notified: spouse    Bedside Neuro assessment performed: Yes    Bedside Handoff given to: FARIHA Lemus    Upon arrival to floor: cardiac monitor applied, patient oriented to room, call bell in reach, and bed in lowest position

## 2024-07-02 NOTE — HOSPITAL COURSE
07/02/2024 NAEO, post op MRI reviewed. Transfer to [facility], discussed with team.

## 2024-07-02 NOTE — HOSPITAL COURSE
7/2: FABY. Post op MRI satisfactory, will continue subgaleal HV today. TTF pending NCC clearance, pending PT/OT.

## 2024-07-02 NOTE — PLAN OF CARE
[facility]  Initial Discharge Assessment       Primary Care Provider: Rod Diehl MD    Admission Diagnosis: Meningioma [D32.9]    Admission Date: 7/1/2024  Expected Discharge Date: 7/5/2024    Transition of Care Barriers: None    Payor: MEDICAID / Plan: [employer]ET[employer] [facility] / Product Type: Managed Medicaid /     Extended Emergency Contact Information  Primary Emergency Contact: emily Perry  Mobile Phone: [phone]  Relation: Spouse  Preferred language: English   needed? No  Secondary Emergency Contact: MARYURIBENJA  Mobile Phone: [phone]  Relation: Relative  Preferred language: English   needed? No    Discharge Plan A: Home Health  Discharge Plan B: Home with family      [id number] [place] - [address]  [address]  [facility] [address]  Phone: [phone] Fax: [phone]      Initial Assessment (most recent)       Adult Discharge Assessment - 07/02/24 1437          Discharge Assessment    Assessment Type Discharge Planning Assessment     Confirmed/corrected address, phone number and insurance Yes     Confirmed Demographics Correct on Facesheet     Source of Information patient     Communicated JAMES with patient/caregiver Yes     Reason For Admission Meningioma     People in Home spouse     Do you expect to return to your current living situation? Yes     Do you have help at home or someone to help you manage your care at home? No     Prior to hospitilization cognitive status: Alert/Oriented     Current cognitive status: Alert/Oriented     Walking or Climbing Stairs Difficulty no     Dressing/Bathing Difficulty no     Home Layout Able to live on 1st floor     Equipment Currently Used at Home none     Readmission within 30 days? No     Patient currently being followed by outpatient case management? No     Do you currently have service(s) that help you manage your care at home? No     Do you take prescription  medications? Yes     Do you have prescription coverage? Yes     Coverage Payor: MEDICAID - AETNA [facility] -     Do you have any problems affording any of your prescribed medications? No     Is the patient taking medications as prescribed? yes     Who is going to help you get home at discharge? Family     How do you get to doctors appointments? car, drives self;family or friend will provide     Are you on dialysis? No     Do you take coumadin? No     Discharge Plan A Home Health     Discharge Plan B Home with family     DME Needed Upon Discharge  none     Discharge Plan discussed with: Patient     Transition of Care Barriers None        Physical Activity    On average, how many days per week do you engage in moderate to strenuous exercise (like a brisk walk)? 0 days     On average, how many minutes do you engage in exercise at this level? 0 min        Financial Resource Strain    How hard is it for you to pay for the very basics like food, housing, medical care, and heating? Not hard at all        Housing Stability    In the last 12 months, was there a time when you were not able to pay the mortgage or rent on time? No     At any time in the past 12 months, were you homeless or living in a shelter (including now)? No        Transportation Needs    Has the lack of transportation kept you from medical appointments, meetings, work or from getting things needed for daily living? No        Food Insecurity    Within the past 12 months, you worried that your food would run out before you got the money to buy more. Never true     Within the past 12 months, the food you bought just didn't last and you didn't have money to get more. Never true        Stress    Do you feel stress - tense, restless, nervous, or anxious, or unable to sleep at night because your mind is troubled all the time - these days? To some extent   loss sister in January       Social Isolation    How often do you feel lonely or isolated  from those around you?  Never        Alcohol Use    Q1: How often do you have a drink containing alcohol? Monthly or less   pt has no9t had a drink in months but will drink socially    Q2: How many drinks containing alcohol do you have on a typical day when you are drinking? 1 or 2     Q3: How often do you have six or more drinks on one occasion? Never        Utilities    In the past 12 months has the electric, gas, oil, or water company threatened to shut off services in your home? No        Health Literacy    How often do you need to have someone help you when you read instructions, pamphlets, or other written material from your doctor or pharmacy? Never                   The SW met with the patient at bedside. The SW placed name and contact information on the blackboard in the patient's room. Use preferred pharmacy / bedside delivery for any necessary medications at the time of discharge. The patient is independent with all ADLs. The patient is not on Dialysis or Coumadin but the pt takes aspirin. The Patient does not use DME or home oxygen, The patient's family  will provide assistance to the patient upon discharge. The patient's  will provide transportation upon discharge. SW will continue to follow for course of hospitalization.     Discharge Plan A and Plan B have been determined by review of patient's clinical status, future medical and therapeutic needs, and coverage/benefits for post-acute care in coordination with multidisciplinary team members.      Jena Diehl MSW, JASONW  [facility]  Case Management Dept.

## 2024-07-02 NOTE — ASSESSMENT & PLAN NOTE
64F with h/o Htn, HLD, possible seizure with fall during which evaluation her meningioma was discovered now s/p R crani for tumor on 7/1, recovering well:    --Patient admitted to [facility] on telemetry      -q1h neurochecks in ICU, q2h neurochecks in stepdown, q4h neurochecks on floor  --Post op MRI satisfactory, expected post op changes  --SBP <160  --Na >135  --Cont home Keppra 500 BID  --Dex 4q6, will wean at discharge  --HOB >30  --Tolerating PO, voiding spon  --Cont drain, 65cc overnight, abx while in place  --PPI  --CEDRICK/SCD/holding SQH in immediate post op period  --Continue to monitor clinically, notify NSGY immediately with any changes in neuro status    Dispo: ongoing, TTF    Closed with staples due for removal on 7/15    D/w Dr. Gimenez and Dr. Marie

## 2024-07-02 NOTE — PT/OT/SLP EVAL
Physical Therapy Co-Evaluation and Co-Treatment with OT    Patient Name:  Meghana Colón   MRN:  [id number]    Recent Surgery: Procedure(s) (LRB):  CRANIOTOMY, WITH NEOPLASM EXCISION USING COMPUTER-ASSISTED NAVIGATION (Right) 1 Day Post-Op    *Co-treatment with OT due to patient complexity and need for two skilled therapists to ensure safe mobilization.   Recommendations:     Discharge Recommendations:   Low Intensity Therapy  Discharge Equipment Recommendations: none   Barriers to discharge: None    Highest Level of Mobility: Gait ~250'  Assistance Required: CGA via HHA x1    Assessment:     Meghana Colón is a 64 y.o. female admitted with a medical diagnosis of Meningioma. She presents with the following impairments/functional limitations:  weakness, impaired endurance, pain    Pt met with HOB elevated, spouse present and agreeable to PT session. Pt reports her PLOF is (I). Currently, she requires CGA via HHA x1 when ambulating ~250' in the hallway. Patient tolerated treatment well on this date where she reported that she felt good moving around the room with no increases of pain or dizziness noted. Patient cued for safety awareness when ambulating as she presents with impulsive behavior moving rather quickly when OOB. She reports that she feels her endurance is poor and that she was short of breath after walking in the hallway. The patient reports that she will have plenty of support from her  at home and is a good candidate for low intensity therapy services once ready for discharge.    Pt would benefit from continued skilled acute PT 3/wk to address above listed functional deficits, provide patient/caregiver education, reduce fall risk, and maximize (I) and safety with functional mobility.     Rehab Prognosis: Good; patient would benefit from acute skilled PT services to address these deficits and reach maximum level of function.      Plan:     During this hospitalization, patient to be seen 3 x/week to  "address the identified rehab impairments via gait training, therapeutic activities, therapeutic exercises, neuromuscular re-education and progress toward the following goals:    Plan of Care Expires:  08/02/24    This plan of care has been discussed with the patient/caregiver, who was included in its development and is in agreement with the identified goals and treatment plan.     Subjective     Communicated with RN prior to session.  Patient agreeable to participate.     Chief Complaint: S/P craniotomy  Patient/Family Comments/goals: "I was waiting for y'all to get out of bed."    Pain/Comfort:  Pain Rating 1:  (Unrated)  Location - Orientation 1: generalized  Location 1: head  Pain Addressed 1: Reposition, Distraction, Cessation of Activity  Pain Rating Post-Intervention 1:  (Unrated)    Patients cultural, spiritual, Latter-day conflicts given the current situation: no    Patient's living environment is as follows:  Living Environment: Pt lives with her spouse in a [facility] with a threshold to enter. Bathroom set-up: walk-in shower  Prior Level of Function: independent with mobility and ADLs  DME used: none  DME owned (not currently used): rolling walker, single point cane, and wheelchair  Upon discharge, patient will have assistance from: Spouse    Objective:     Patient found HOB elevated with telemetry, pulse ox (continuous), blood pressure cuff, hemovac  upon PT entry to room.    General Precautions: Standard, fall   Orthopedic Precautions:N/A   Braces: N/A   /60   Pulse 70   Temp 98.4 °F (36.9 °C) (Oral)   Resp 18   Ht 5' 11" (1.803 m)   Wt 93.8 kg (206 lb 12.7 oz)   SpO2 (!) 91%   Breastfeeding No   BMI 28.84 kg/m²   Oxygen Device: Room air      Exams:    Cognition:  Patient is oriented to Person, Place, Time, Situation  Follows one-step commands  Insight to deficits/safety awareness: Intact    Gross Motor Coordination: No deficits noted during functional mobility tasks     Postural " examination/scapula alignment: Slouched posture    Lower Extremity Range of Motion:  Right Lower Extremity: WFL  Left Lower Extremity: WFL    Lower Extremity Strength    Right LE  Left LE    Hip Flexion: 4+/5 Hip Flexion: 4/5   Knee Extension: 4+/5 Knee Extension: 4/5   Knee Flexion: 4+/5 Knee Flexion: 4/5   Ankle Dorsiflexion:  4+/5 Ankle Dorsiflexion: 4/5   Ankle Plantarflexion: 4+/5 Ankle Plantarflexion: 4/5        Sensation:   Light touch sensation: Intact BLEs    Functional Mobility:    Bed Mobility:  Supine to Sit: Stand-by Assistance on L side of bed  Scooting anteriorly to EOB to plant feet on floor: Stand-by Assistance    Transfers:   Sit to Stand Transfer: Contact Guard Assistance  from EOB/Toilet with HHA x1  X2 trials (One from EOB and one from toilet both requiring CGA via HHA x1)             Gait:  Patient received gait training 250 feet with Contact Guard Assistance and HHA x1  Gait Assessment: occasional unsteady gait  Gait Pattern Observed: Step-through reciprocal  Comments: All lines remained intact throughout ambulation trial, gait belt utilized, portable monitor utilized  Patient cued for safe navigation in hallway 2/2 impulsive behavior.    Balance:  Static Sit:   Stand-By Assist at EOB  Dynamic sit:  Stand-By Assist   Static Stand:   Contact-Guard Assist with Hand-held assist x 1  Dynamic Stand:  Contact-Guard Assist with Hand-held assist x 1  Patient assisted to bathroom where she successfully voided and completed a toilet and shower transfer.        Therapeutic Activities/Exercises     Patient assisted with functional mobility as noted above  Discussed at length benefits of PT as well as d/c recommendations. Pt agreeable  Patient educated on the importance of early mobility, OOB to prevent functional decline during hospital stay  Patient was instructed to utilize staff assistance for mobility/transfers.  Patient is appropriate to transfer with CGA and RN/PCT assist  Patient educated on PT POC  and role of PT in acute care  White board updated to include patient's safest level of mobility with staff assistance, RN also updated    AM-PAC 6 CLICK MOBILITY  Turning over in bed (including adjusting bedclothes, sheets and blankets)?: 4  Sitting down on and standing up from a chair with arms (e.g., wheelchair, bedside commode, etc.): 4  Moving from lying on back to sitting on the side of the bed?: 4  Moving to and from a bed to a chair (including a wheelchair)?: 4  Need to walk in hospital room?: 4  Climbing 3-5 steps with a railing?: 3  Basic Mobility Total Score: 23      Patient left up in chair with all lines intact, call button in reach, chair alarm on, rn notified, and spouse present.      History/Goals:     PAST MEDICAL HISTORY:  Past Medical History:   Diagnosis Date    Hyperlipidemia     Hypertension        Past Surgical History:   Procedure Laterality Date    BONE MARROW BIOPSY      BREAST BIOPSY      COLONOSCOPY  2016    HYSTERECTOMY      THERAPEUTIC          GOALS:   Multidisciplinary Problems       Physical Therapy Goals          Problem: Physical Therapy    Goal Priority Disciplines Outcome Goal Variances Interventions   Physical Therapy Goal     PT, PT/OT Progressing     Description: Goals to be met by: 24     Patient will increase functional independence with mobility by performin. Supine to sit with [place]  2. Sit to supine with [place]  3. Sit to stand transfer with [place]  4. Bed to chair transfer with [place] using LRAD  5. Gait  x 100 feet with [place] using LRAD.   6. Ascend/descend 1 stair with bilateral Handrails [place] using LRAD.   7. Lower extremity exercise program x10 reps per handout, with independence                         Time Tracking:     PT Received On: 24  PT Start Time: 1305     PT Stop Time: 1327  PT Total Time (min): 22 min     Billable Minutes: Evaluation 10 and Therapeutic Activity 12      Shayne Chadwick  EVELIN  07/02/2024  Pager# [phone]

## 2024-07-02 NOTE — ASSESSMENT & PLAN NOTE
65 y/o F s/p R frontotemporal craniotomy for resection of sphenoid wing meningioma.    -Admit to NCC  -NSGY following  -VS/Neuro checks q1  -Subgaleal HV drain; ancef for drain PPX  -HOB >/= 30  -Keppra BID  -Dex 4 q6  -Famotidine BID while on dex  -Multimodal pain regimen  -SBP goal < 140  -Regular diet once passes Abdi; d/c IVF when tolerating diet  -Monitor I/O  -CBC, CMP, Mag, Phos daily  -SCDs for DVT PPX; start chemical PPX when appropriate  -PT/OT

## 2024-07-02 NOTE — PT/OT/SLP EVAL
Occupational Therapy   Evaluation and Discharge Note  Co- Treatment with PT     Name: Meghana Colón  MRN: [id number]  Admitting Diagnosis: Meningioma  Recent Surgery: Procedure(s) (LRB):  CRANIOTOMY, WITH NEOPLASM EXCISION USING COMPUTER-ASSISTED NAVIGATION (Right) 1 Day Post-Op    Recommendations:     Discharge Recommendations: Low Intensity Therapy  Discharge Equipment Recommendations: none  Barriers to discharge:  None    Assessment:     Meghana Colón is a 64 y.o. female with a medical diagnosis of Meningioma. At this time, patient is functioning at their prior level of function and does not require further acute OT services.     Plan:     During this hospitalization, patient does not require further acute OT services.  Please re-consult if situation changes.    Plan of Care Reviewed with: patient, spouse    Subjective     Chief Complaint: None at this time   Patient/Family Comments/goals: DC     Occupational Profile:  Living Environment: See PT note   Previous level of function: Independent   Roles and Routines: Unknown   Equipment Used at home: none  Assistance upon Discharge:      Pain/Comfort:  Pain Rating 1: 0/10  Location - Orientation 1: generalized  Location 1: head  Pain Addressed 1: Reposition, Distraction, Cessation of Activity    Patients cultural, spiritual, Religion conflicts given the current situation: no    Objective:     Communicated with: RN prior to session.  Patient found supine with telemetry, pulse ox (continuous), blood pressure cuff, hemovac upon OT entry to room.    General Precautions: Standard, fall  Orthopedic Precautions: N/A  Braces: N/A  Respiratory Status: Room air     Occupational Performance:    Bed Mobility:    Patient completed Scooting/Bridging with stand by assistance  Patient completed Supine to Sit with stand by assistance    Functional Mobility/Transfers:  Patient completed Sit <> Stand Transfer with contact guard assistance  with  no assistive device    Functional Mobility:   Patient completed 12' to bathroom with CGA and no AD  Patient completed toilet transfer with CGA and no AD   Patient completed shower transfer with CGA and no AD   Patient completed 300' around unit with CGA and no AD     Activities of Daily Living:  Grooming: stand by assistance at sink to complete oral care and facial grooming  Toileting: stand by assistance at commode     Cognitive/Visual Perceptual:  Cognitive/Psychosocial Skills:     -       Oriented to: Person, Place, Time, and Situation   -       Follows Commands/attention:Follows multistep  commands  -       Communication: clear/fluent  -       Safety awareness/insight to disability: patient slightly impulsive    Visual/Perceptual:      -Intact      Physical Exam:  Sensation:    -       Intact  Upper Extremity Range of Motion:     -       Right Upper Extremity: WFL  -       Left Upper Extremity: WFL  Upper Extremity Strength:    -       Right Upper Extremity: WFL  -       Left Upper Extremity: WFL   Strength:    -       Right Upper Extremity: WFL  -       Left Upper Extremity: WFL  Fine Motor Coordination:    -       Intact    AMPAC 6 Click ADL:  AMPAC Total Score:      Treatment & Education:  Co-evaluation completed due to patient medical instability and to ensure patient safety. Provided education regarding role of OT, POC, & discharge recommendations.  Pt with no further questions/concerns at this time. OT provided education on home recommendations and fall prevention in preparation for D/C.     Patient left supine with all lines intact and call button in reach    GOALS:   Multidisciplinary Problems       Occupational Therapy Goals       Not on file              Multidisciplinary Problems (Resolved)          Problem: Occupational Therapy    Goal Priority Disciplines Outcome Interventions   Occupational Therapy Goal   (Resolved)     OT, PT/OT Met    Description: Goals to be met by: 7/3/24    Patient will increase functional  independence with ADLs by performing:    Toileting from toilet with Moderate Assistance for hygiene and clothing management.                          History:     Past Medical History:   Diagnosis Date    Hyperlipidemia     Hypertension          Past Surgical History:   Procedure Laterality Date    BONE MARROW BIOPSY      BREAST BIOPSY      COLONOSCOPY  2016    HYSTERECTOMY      THERAPEUTIC          Time Tracking:     OT Date of Treatment: 24  OT Start Time: 1304  OT Stop Time: 1329  OT Total Time (min): 25 min    Billable Minutes:Evaluation 10  Self Care/Home Management 15    2024

## 2024-07-02 NOTE — PLAN OF CARE
Problem: Physical Therapy  Goal: Physical Therapy Goal  Description: Goals to be met by: 24     Patient will increase functional independence with mobility by performin. Supine to sit with [place]  2. Sit to supine with [place]  3. Sit to stand transfer with [place]  4. Bed to chair transfer with [place] using LRAD  5. Gait  x 100 feet with [place] using LRAD.   6. Ascend/descend 1 stair with bilateral Handrails [place] using LRAD.   7. Lower extremity exercise program x10 reps per handout, with independence    Outcome: Progressing

## 2024-07-02 NOTE — HPI
Meghana Colón is a 64 y.o.  female with below PMH, who is referred for evaluation of right middle fossa/temporal extradural mass.  MRI was obtained after an episode where she was found on the floor in which she hit her head.  There was a question that this was related to seizure activity.  She was placed on Keppra and takes it as directed.  She is quite anxious about this mass as several of her family members have  due to brain metastases in the past.  She is eager to have surgery to remove it.     She reports right hand numbness, tremors, headaches, weakness on her left side more than her right.  She also reports that her speech is off but thinks it could be due to her anxiety.     She was seen in the emergency department where she was placed on Decadron which she completed on .  She still has headaches on the right sides since stopping the Decadron.       She also has a history of central thrombocytopenia for which he was taking aspirin but has since stopped due to the recent fall.

## 2024-07-02 NOTE — PROGRESS NOTES
[facility] - Neuro Critical Care  Neurosurgery  Progress Note    Subjective:     History of Present Illness: Meghana Colón is a 64 y.o.  female with below PMH, who is referred for evaluation of right middle fossa/temporal extradural mass.  MRI was obtained after an episode where she was found on the floor in which she hit her head.  There was a question that this was related to seizure activity.  She was placed on Keppra and takes it as directed.  She is quite anxious about this mass as several of her family members have  due to brain metastases in the past.  She is eager to have surgery to remove it.     She reports right hand numbness, tremors, headaches, weakness on her left side more than her right.  She also reports that her speech is off but thinks it could be due to her anxiety.     She was seen in the emergency department where she was placed on Decadron which she completed on .  She still has headaches on the right sides since stopping the Decadron.       She also has a history of central thrombocytopenia for which he was taking aspirin but has since stopped due to the recent fall.    Post-Op Info:  Procedure(s) (LRB):  CRANIOTOMY, WITH NEOPLASM EXCISION USING COMPUTER-ASSISTED NAVIGATION (Right)   1 Day Post-Op   Interval History: NAEON. Post op MRI satisfactory, will continue subgaleal HV today. TTF pending NCC clearance, pending PT/OT.    Medications:  Continuous Infusions:  Scheduled Meds:   amLODIPine  10 mg Oral Daily    atorvastatin  10 mg Oral QHS    ceFAZolin (Ancef) IV (PEDS and ADULTS)  2 g Intravenous Q8H    dexAMETHasone  4 mg Intravenous Q6H    EScitalopram oxalate  10 mg Oral Daily    famotidine  20 mg Oral BID    levETIRAcetam  500 mg Oral BID    methocarbamoL  500 mg Oral QID    mupirocin   Nasal BID    polyethylene glycol  17 g Oral Daily    senna-docusate 8.6-50 mg  2 tablet Oral BID     PRN Meds:  Current Facility-Administered Medications:     acetaminophen, 650 mg, Oral, Q6H  PRN    hydrALAZINE, 10 mg, Intravenous, Q4H PRN    labetalol, 10 mg, Intravenous, Q4H PRN    ondansetron, 4 mg, Intravenous, Q12H PRN    oxyCODONE, 5 mg, Oral, Q4H PRN    oxyCODONE, 10 mg, Oral, Q6H PRN     Review of Systems  Objective:     Weight: 93.8 kg (206 lb 12.7 oz)  Body mass index is 28.84 kg/m².  Vital Signs (Most Recent):  Temp: 98.4 °F (36.9 °C) (07/02/24 1101)  Pulse: 70 (07/02/24 1301)  Resp: 18 (07/02/24 1301)  BP: 117/60 (07/02/24 1301)  SpO2: (!) 91 % (07/02/24 1301) Vital Signs (24h Range):  Temp:  [97.8 °F (36.6 °C)-98.4 °F (36.9 °C)] 98.4 °F (36.9 °C)  Pulse:  [63-86] 70  Resp:  [18-33] 18  SpO2:  [91 %-98 %] 91 %  BP: (117-149)/(60-95) 117/60  Arterial Line BP: (104-181)/() 162/97     Date 07/02/24 0700 - 07/03/24 0659   Shift 1250-2163 0561-2477 4413-0823 24 Hour Total   INTAKE   I.V.(mL/kg) 97.5(1)   97.5(1)   Shift Total(mL/kg) 97.5(1)   97.5(1)   OUTPUT   Shift Total(mL/kg)       Weight (kg) 93.8 93.8 93.8 93.8                            Closed/Suction Drain 07/01/24 1120 Tube - 1 Anterior;Right  Accordion 10 Fr. (Active)   Site Description Unable to view 07/02/24 1301   Dressing Type Other (Comment) 07/02/24 1301   Dressing Status Clean;Dry;Intact 07/02/24 1301   Dressing Intervention Integrity maintained 07/02/24 1301   Drainage Bloody 07/02/24 1301   Status To bulb suction 07/02/24 1301   Output (mL) 130 mL 07/02/24 0505       Female External Urinary Catheter w/ Suction 07/02/24 0551 (Active)   Skin no redness;no breakdown 07/02/24 1301   Tolerance no signs/symptoms of discomfort 07/02/24 1301   Suction Continuous suction at 60 mmHg 07/02/24 1101   Date of last wick change 07/02/24 07/02/24 0701   Time of last wick change 0600 07/02/24 0701          Physical Exam         Neurosurgery Physical Exam  General: no distress  Head: Non-traumatic, normocephalic  Eyes: Pupils equal, EOMi  Neck: Supple, normal ROM, no tenderness to palpation  CVS: Normal rate and rhythm, distal pulses  present  Pulm: Symmetric expansion, no respiratory distress  GI: Abdomen nondistended, nontender  MSK: Moves all extremities without restriction, atraumatic  Skin: Dressing c/d/i  Psych: Normal thought content and cognition  Neuro: AOx3, GCS [id number]  CNII-XII: Intact on fine exam, PERRL, EOMi, facial sensation preserved, no facial asymmetry, tongue/uvula/palate midline, shoulder shrug equal, No pronator drift  Extremities:  Motor:  Upper Extremity:                                  Deltoids        Triceps        Biceps        WE        WF                Interosseous           R        5/5 5/5 5/5 5/5 5/5 5/5 5/5           L        5/5 5/5 5/5 5/5 5/5 5/5 5/5  Lower Extremity:                                      HF        KE        KF        DF        PF        EHL           R       5/5 5/5 5/5 5/5 5/5 5/5           L       5/5 5/5 5/5 5/5 5/5 5/5    Sensory:      Sensorium intact throughout, no sensory level present    Coordination:      Coordination intact throughout    Bedside exam by Dr. Gimenez this Am    Significant Labs:  Recent Labs   Lab 07/01/24  1305 07/02/24  0003   * 120*    139   K 3.3* 4.1    107   CO2 21* 21*   BUN 12 11   CREATININE 0.6 0.6   CALCIUM 8.1* 8.5*   MG  --  2.0     Recent Labs   Lab 07/01/24  0821 07/01/24  1305 07/02/24  0003   WBC 11.32 15.83* 17.78*   HGB 12.1 12.9 12.8   HCT 37.0 37.7 38.3   * 539* 542*     Recent Labs   Lab 07/01/24  0821   INR 1.0   APTT 24.1     Microbiology Results (last 7 days)       ** No results found for the last 168 hours. **          All pertinent labs from the last 24 hours have been reviewed.    Significant Diagnostics:  I have reviewed all pertinent imaging results/findings within the past 24 hours.    MRI BRAIN W WO  CONTRAST    Result Date: 7/1/2024  Evolving postoperative change of right frontotemporal craniotomy for resection of right middle cranial fossa enhancing extra-axial lesion, presumed meningioma.  Evolving blood products along the site of lesion resection with smooth dural enhancement about the operative site, likely representing sequela of postoperative change.  No obvious nodular or masslike enhancement. Overall stable surrounding vasogenic edema about the right temporal lobe and extending along the internal and external capsules with similar minimal 1 mm leftward midline shift.  No worsening mass effect. Focal diffusion restriction mainly involving the right anterior temporal pole cortex, as well as about the lateral right inferior frontal lobe.  Findings may relate to evolving blood products, noting that findings may also relate to adjacent T5 lysed tissue.  Correlation and continued follow-up is recommended. Electronically signed by resident: Flavio Ibanez Date:    07/01/2024 Time:    16:08 Electronically signed by: Zak Bejarano Date:    07/01/2024 Time:    16:41     Assessment/Plan:     * Meningioma  64F with h/o Htn, HLD, possible seizure with fall during which evaluation her meningioma was discovered now s/p R crani for tumor on 7/1, recovering well:    --Patient admitted to [facility] on telemetry      -q1h neurochecks in ICU, q2h neurochecks in stepdown, q4h neurochecks on floor  --Post op MRI satisfactory, expected post op changes  --SBP <160  --Na >135  --Cont home Keppra 500 BID  --Dex 4q6, will wean at discharge  --HOB >30  --Tolerating PO, voiding spon  --Cont drain, 65cc overnight, abx while in place  --PPI  --CEDRICK/SCD/holding SQH in immediate post op period  --Continue to monitor clinically, notify NSGY immediately with any changes in neuro status    Dispo: ongoing, TTF    Closed with staples due for removal on 7/15    D/w Dr. Gimenez and Dr. Frank Leon MD  Neurosurgery  [facility] - Neuro  Critical Care

## 2024-07-02 NOTE — SUBJECTIVE & OBJECTIVE
Interval History: NAEON. Post op MRI satisfactory, will continue subgaleal HV today. TTF pending NCC clearance, pending PT/OT.    Medications:  Continuous Infusions:  Scheduled Meds:   amLODIPine  10 mg Oral Daily    atorvastatin  10 mg Oral QHS    ceFAZolin (Ancef) IV (PEDS and ADULTS)  2 g Intravenous Q8H    dexAMETHasone  4 mg Intravenous Q6H    EScitalopram oxalate  10 mg Oral Daily    famotidine  20 mg Oral BID    levETIRAcetam  500 mg Oral BID    methocarbamoL  500 mg Oral QID    mupirocin   Nasal BID    polyethylene glycol  17 g Oral Daily    senna-docusate 8.6-50 mg  2 tablet Oral BID     PRN Meds:  Current Facility-Administered Medications:     acetaminophen, 650 mg, Oral, Q6H PRN    hydrALAZINE, 10 mg, Intravenous, Q4H PRN    labetalol, 10 mg, Intravenous, Q4H PRN    ondansetron, 4 mg, Intravenous, Q12H PRN    oxyCODONE, 5 mg, Oral, Q4H PRN    oxyCODONE, 10 mg, Oral, Q6H PRN     Review of Systems  Objective:     Weight: 93.8 kg (206 lb 12.7 oz)  Body mass index is 28.84 kg/m².  Vital Signs (Most Recent):  Temp: 98.4 °F (36.9 °C) (07/02/24 1101)  Pulse: 70 (07/02/24 1301)  Resp: 18 (07/02/24 1301)  BP: 117/60 (07/02/24 1301)  SpO2: (!) 91 % (07/02/24 1301) Vital Signs (24h Range):  Temp:  [97.8 °F (36.6 °C)-98.4 °F (36.9 °C)] 98.4 °F (36.9 °C)  Pulse:  [63-86] 70  Resp:  [18-33] 18  SpO2:  [91 %-98 %] 91 %  BP: (117-149)/(60-95) 117/60  Arterial Line BP: (104-181)/() 162/97     Date 07/02/24 0700 - 07/03/24 0659   Shift 8141-5865 5636-1944 8715-7790 24 Hour Total   INTAKE   I.V.(mL/kg) 97.5(1)   97.5(1)   Shift Total(mL/kg) 97.5(1)   97.5(1)   OUTPUT   Shift Total(mL/kg)       Weight (kg) 93.8 93.8 93.8 93.8                            Closed/Suction Drain 07/01/24 1120 Tube - 1 Anterior;Right  Accordion 10 Fr. (Active)   Site Description Unable to view 07/02/24 1301   Dressing Type Other (Comment) 07/02/24 1301   Dressing Status Clean;Dry;Intact 07/02/24 1301   Dressing Intervention Integrity  maintained 07/02/24 1301   Drainage Bloody 07/02/24 1301   Status To bulb suction 07/02/24 1301   Output (mL) 130 mL 07/02/24 0505       Female External Urinary Catheter w/ Suction 07/02/24 0551 (Active)   Skin no redness;no breakdown 07/02/24 1301   Tolerance no signs/symptoms of discomfort 07/02/24 1301   Suction Continuous suction at 60 mmHg 07/02/24 1101   Date of last wick change 07/02/24 07/02/24 0701   Time of last wick change 0600 07/02/24 0701          Physical Exam         Neurosurgery Physical Exam  General: no distress  Head: Non-traumatic, normocephalic  Eyes: Pupils equal, EOMi  Neck: Supple, normal ROM, no tenderness to palpation  CVS: Normal rate and rhythm, distal pulses present  Pulm: Symmetric expansion, no respiratory distress  GI: Abdomen nondistended, nontender  MSK: Moves all extremities without restriction, atraumatic  Skin: Dressing c/d/i  Psych: Normal thought content and cognition  Neuro: AOx3, GCS E4V5M6  CNII-XII: Intact on fine exam, PERRL, EOMi, facial sensation preserved, no facial asymmetry, tongue/uvula/palate midline, shoulder shrug equal, No pronator drift  Extremities:  Motor:  Upper Extremity:                                  Deltoids        Triceps        Biceps        WE        WF                Interosseous           R        5/5              5/5              5/5            5/5         5/5         5/5                5/5           L        5/5              5/5              5/5            5/5         5/5         5/5                5/5  Lower Extremity:                                      HF        KE        KF        DF        PF        EHL           R       5/5        5/5        5/5        5/5        5/5        5/5           L       5/5        5/5        5/5        5/5        5/5        5/5    Sensory:      Sensorium intact throughout, no sensory level present    Coordination:      Coordination intact throughout    Bedside exam by Dr. Gimenez this Am    Significant  Labs:  Recent Labs   Lab 07/01/24  1305 07/02/24  0003   * 120*    139   K 3.3* 4.1    107   CO2 21* 21*   BUN 12 11   CREATININE 0.6 0.6   CALCIUM 8.1* 8.5*   MG  --  2.0     Recent Labs   Lab 07/01/24  0821 07/01/24  1305 07/02/24  0003   WBC 11.32 15.83* 17.78*   HGB 12.1 12.9 12.8   HCT 37.0 37.7 38.3   * 539* 542*     Recent Labs   Lab 07/01/24  0821   INR 1.0   APTT 24.1     Microbiology Results (last 7 days)       ** No results found for the last 168 hours. **          All pertinent labs from the last 24 hours have been reviewed.    Significant Diagnostics:  I have reviewed all pertinent imaging results/findings within the past 24 hours.    MRI BRAIN W WO CONTRAST    Result Date: 7/1/2024  Evolving postoperative change of right frontotemporal craniotomy for resection of right middle cranial fossa enhancing extra-axial lesion, presumed meningioma.  Evolving blood products along the site of lesion resection with smooth dural enhancement about the operative site, likely representing sequela of postoperative change.  No obvious nodular or masslike enhancement. Overall stable surrounding vasogenic edema about the right temporal lobe and extending along the internal and external capsules with similar minimal 1 mm leftward midline shift.  No worsening mass effect. Focal diffusion restriction mainly involving the right anterior temporal pole cortex, as well as about the lateral right inferior frontal lobe.  Findings may relate to evolving blood products, noting that findings may also relate to adjacent T5 lysed tissue.  Correlation and continued follow-up is recommended. Electronically signed by resident: Flavio Ibanez Date:    07/01/2024 Time:    16:08 Electronically signed by: Zak Bejarano Date:    07/01/2024 Time:    16:41

## 2024-07-02 NOTE — PLAN OF CARE
Problem: Occupational Therapy  Goal: Occupational Therapy Goal  Description: Goals to be met by: 7/3/24    Patient will increase functional independence with ADLs by performing:    Toileting from toilet with Moderate Assistance for hygiene and clothing management.     Outcome: Met

## 2024-07-03 VITALS
HEIGHT: 71 IN | TEMPERATURE: 98 F | OXYGEN SATURATION: 97 % | RESPIRATION RATE: 18 BRPM | BODY MASS INDEX: 28.95 KG/M2 | DIASTOLIC BLOOD PRESSURE: 89 MMHG | WEIGHT: 206.81 LBS | HEART RATE: 73 BPM | SYSTOLIC BLOOD PRESSURE: 143 MMHG

## 2024-07-03 PROCEDURE — 25000003 PHARM REV CODE 250: Performed by: REGISTERED NURSE

## 2024-07-03 PROCEDURE — 63600175 PHARM REV CODE 636 W HCPCS: Performed by: STUDENT IN AN ORGANIZED HEALTH CARE EDUCATION/TRAINING PROGRAM

## 2024-07-03 PROCEDURE — 99024 POSTOP FOLLOW-UP VISIT: CPT | Mod: ,,,

## 2024-07-03 PROCEDURE — 25000003 PHARM REV CODE 250: Performed by: STUDENT IN AN ORGANIZED HEALTH CARE EDUCATION/TRAINING PROGRAM

## 2024-07-03 PROCEDURE — 97110 THERAPEUTIC EXERCISES: CPT

## 2024-07-03 RX ORDER — DEXAMETHASONE 2 MG/1
TABLET ORAL
Qty: 60 TABLET | Refills: 0 | Status: SHIPPED | OUTPATIENT
Start: 2024-07-03 | End: 2024-07-21

## 2024-07-03 RX ORDER — FAMOTIDINE 20 MG/1
20 TABLET, FILM COATED ORAL 2 TIMES DAILY
Qty: 60 TABLET | Refills: 0 | Status: SHIPPED | OUTPATIENT
Start: 2024-07-03 | End: 2024-08-02

## 2024-07-03 RX ORDER — OXYCODONE HYDROCHLORIDE 5 MG/1
5 TABLET ORAL EVERY 6 HOURS PRN
Qty: 28 TABLET | Refills: 0 | Status: SHIPPED | OUTPATIENT
Start: 2024-07-03

## 2024-07-03 RX ADMIN — OXYCODONE HYDROCHLORIDE 10 MG: 10 TABLET ORAL at 07:07

## 2024-07-03 RX ADMIN — POLYETHYLENE GLYCOL 3350 17 G: 17 POWDER, FOR SOLUTION ORAL at 09:07

## 2024-07-03 RX ADMIN — FAMOTIDINE 20 MG: 20 TABLET ORAL at 09:07

## 2024-07-03 RX ADMIN — LEVETIRACETAM 500 MG: 500 TABLET, FILM COATED ORAL at 09:07

## 2024-07-03 RX ADMIN — AMLODIPINE BESYLATE 10 MG: 10 TABLET ORAL at 09:07

## 2024-07-03 RX ADMIN — MUPIROCIN: 20 OINTMENT TOPICAL at 09:07

## 2024-07-03 RX ADMIN — CEFAZOLIN 2 G: 2 INJECTION, POWDER, FOR SOLUTION INTRAMUSCULAR; INTRAVENOUS at 05:07

## 2024-07-03 RX ADMIN — SENNOSIDES AND DOCUSATE SODIUM 2 TABLET: 50; 8.6 TABLET ORAL at 09:07

## 2024-07-03 RX ADMIN — DEXAMETHASONE SODIUM PHOSPHATE 4 MG: 4 INJECTION INTRA-ARTICULAR; INTRALESIONAL; INTRAMUSCULAR; INTRAVENOUS; SOFT TISSUE at 05:07

## 2024-07-03 RX ADMIN — ESCITALOPRAM OXALATE 10 MG: 10 TABLET ORAL at 09:07

## 2024-07-03 RX ADMIN — METHOCARBAMOL 500 MG: 500 TABLET ORAL at 09:07

## 2024-07-03 NOTE — NURSING
Pt D/C'd home. IV & tele removed per order. Discharge paperwork reviewed w/ pt &  at bedside. Pt belongings collected by pt and . Pt transported via wheelchair transport to 's personal vehicle.

## 2024-07-03 NOTE — DISCHARGE SUMMARY
[facility] ([facility])  Neurosurgery  Discharge Summary      Patient Name: Meghana Colón  MRN: [id number]  Admission Date: 2024  Hospital Length of Stay: 2 days  Discharge Date and Time:  2024 11:05 AM  Attending Physician: Terry Marie DO   Discharging Provider: Tory Bonilla PA-C  Primary Care Provider: Rod Diehl MD    HPI:   Meghana Colón is a 64 y.o.  female with below PMH, who is referred for evaluation of right middle fossa/temporal extradural mass.  MRI was obtained after an episode where she was found on the floor in which she hit her head.  There was a question that this was related to seizure activity.  She was placed on Keppra and takes it as directed.  She is quite anxious about this mass as several of her family members have  due to brain metastases in the past.  She is eager to have surgery to remove it.     She reports right hand numbness, tremors, headaches, weakness on her left side more than her right.  She also reports that her speech is off but thinks it could be due to her anxiety.     She was seen in the emergency department where she was placed on Decadron which she completed on .  She still has headaches on the right sides since stopping the Decadron.       She also has a history of central thrombocytopenia for which he was taking aspirin but has since stopped due to the recent fall.    Procedure(s) (LRB):  CRANIOTOMY, WITH NEOPLASM EXCISION USING COMPUTER-ASSISTED NAVIGATION (Right)     Hospital Course: : NAEON. Post op MRI satisfactory, will continue subgaleal HV today. TTF pending NCC clearance, pending PT/OT.  7/3: NAEON. Neuro stable. Patient ambulating without issues. Pain controlled. SG drain with low output, removed without issues. Patient is medically stable for discharge to home. Incision care and activity recommendations reviewed. Plan of care discussed with patient and she voiced understanding. All her questions were answered.  Follow-up in Neurosurgery clinic arranged.     Physical exam:  General: no distress  Head: Non-traumatic, normocephalic  Eyes: Pupils equal, EOMi  Neck: Supple, normal ROM, no tenderness to palpation  CVS: Normal rate and rhythm, distal pulses present  Pulm: Symmetric expansion, no respiratory distress  GI: Abdomen nondistended, nontender  MSK: Moves all extremities without restriction, atraumatic  Skin: Dressing c/d/i  Psych: Normal thought content and cognition  Neuro: AOx3, GCS E4V5M6  CNII-XII: Intact on fine exam, PERRL, EOMi, facial sensation preserved, no facial asymmetry, tongue/uvula/palate midline, shoulder shrug equal, No pronator drift  Extremities:  Motor:  Upper Extremity:                                  Deltoids        Triceps        Biceps        WE        WF                Interosseous           R        5/5              5/5              5/5            5/5         5/5         5/5                5/5           L        5/5              5/5              5/5            5/5         5/5         5/5                5/5  Lower Extremity:                                      HF        KE        KF        DF        PF        EHL           R       5/5        5/5        5/5        5/5        5/5        5/5           L       5/5        5/5        5/5        5/5        5/5        5/5     Sensory:      Sensorium intact throughout, no sensory level present.     Coordination: intact.    Goals of Care Treatment Preferences:  Code Status: Full Code      Consults:     Significant Diagnostic Studies: Labs: CMP   Recent Labs   Lab 07/01/24  1305 07/02/24  0003    139   K 3.3* 4.1    107   CO2 21* 21*   * 120*   BUN 12 11   CREATININE 0.6 0.6   CALCIUM 8.1* 8.5*   ANIONGAP 10 11   , CBC   Recent Labs   Lab 07/01/24  1305 07/02/24  0003   WBC 15.83* 17.78*   HGB 12.9 12.8   HCT 37.7 38.3   * 542*   , and All labs within the past 24 hours have been reviewed  Radiology: MRI: brain with/without  contrast    Pending Diagnostic Studies:       Procedure Component Value Units Date/Time    Specimen to Pathology, Surgery Neurosurgery [[id number]] Collected: 07/01/24 1136    Order Status: Sent Lab Status: In process Updated: 07/01/24 1558    Specimen: Tissue           Final Active Diagnoses:    Diagnosis Date Noted POA    PRINCIPAL PROBLEM:  Meningioma [D32.9] 06/18/2024 Yes    Benign essential hypertension [I10] 09/23/2022 Yes    Hyperlipidemia [E78.5] 09/23/2022 Yes    Generalized anxiety disorder [F41.1] 09/23/2022 Yes    Thrombocytosis [D75.839] 09/23/2022 Yes      Problems Resolved During this Admission:      Discharged Condition: good     Disposition: Home or Self Care    Follow Up:    Patient Instructions:      Ambulatory referral/consult to Physical/Occupational Therapy   Standing Status: Future   Referral Priority: Routine Referral Type: Physical Medicine   Referral Reason: Specialty Services Required   Number of Visits Requested: 1     Notify your health care provider if you experience any of the following:  increased confusion or weakness     Notify your health care provider if you experience any of the following:  persistent dizziness, light-headedness, or visual disturbances     Notify your health care provider if you experience any of the following:  worsening rash     Notify your health care provider if you experience any of the following:  severe persistent headache     Notify your health care provider if you experience any of the following:  difficulty breathing or increased cough     Notify your health care provider if you experience any of the following:  redness, tenderness, or signs of infection (pain, swelling, redness, odor or green/yellow discharge around incision site)     Notify your health care provider if you experience any of the following:  severe uncontrolled pain     Notify your health care provider if you experience any of the following:  persistent nausea and vomiting or diarrhea      Notify your health care provider if you experience any of the following:  temperature >100.4     Activity as tolerated     Medications:  Reconciled Home Medications:      Medication List        START taking these medications      famotidine 20 MG tablet  Commonly known as: PEPCID  Take 1 tablet (20 mg total) by mouth 2 (two) times daily.     oxyCODONE 5 MG immediate release tablet  Commonly known as: ROXICODONE  Take 1 tablet (5 mg total) by mouth every 6 (six) hours as needed for Pain.            CHANGE how you take these medications      atorvastatin 20 MG tablet  Commonly known as: LIPITOR  TAKE 1 TABLET BY MOUTH EVERY DAY  What changed:   how much to take  when to take this     dexAMETHasone 2 MG tablet  Commonly known as: DECADRON  Take 2 tablets by mouth every 8 hours for 3 days, THEN 2 tablets every 12 hours for 3 days, THEN 1 tablet every 6 hours for 3 days, THEN 1 tablet  every 8 hours for 3 days, THEN 1 tablet every 12  hours for 3 days, THEN 1 tablet once daily for 3 days.  Start taking on: July 3, 2024  What changed: See the new instructions.            CONTINUE taking these medications      ALPRAZolam 0.5 MG Tb24  Commonly known as: XANAX XR  Take 0.25 mg by mouth 2 (two) times daily as needed.     amLODIPine 10 MG tablet  Commonly known as: NORVASC  Take 1 tablet (10 mg total) by mouth once daily.     busPIRone 7.5 MG tablet  Commonly known as: BUSPAR  Take 7.5 mg by mouth 2 (two) times daily.     EScitalopram oxalate 10 MG tablet  Commonly known as: LEXAPRO  Take 10 mg by mouth once daily.     levETIRAcetam 500 MG Tab  Commonly known as: KEPPRA  500 mg 2 (two) times daily.              Tory Bonilla PA-C  Neurosurgery  [facility] - Neurosurgery ([facility])

## 2024-07-03 NOTE — PLAN OF CARE
SW provided handoff via secure chat.  Pt is progressing per treatment team.   Pt indicates that she has help at home upon discharge.   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       Jena Diehl MSW, JASONW  [facility]  Case Management Dept.

## 2024-07-03 NOTE — PT/OT/SLP PROGRESS
Physical Therapy Treatment and DC    Patient Name:  Meghana Colón   MRN:  [id number]    Recent Surgery: Procedure(s) (LRB):  CRANIOTOMY, WITH NEOPLASM EXCISION USING COMPUTER-ASSISTED NAVIGATION (Right) 2 Days Post-Op    Recommendations:     Discharge Recommendations:   Low Intensity Therapy  Discharge Equipment Recommendations: none   Barriers to discharge: None    Highest Level of Mobility: Gait ~200'  Assistance Required: SBA    Assessment:     Meghana Colón is a 64 y.o. female admitted with a medical diagnosis of Meningioma.    Pt met with HOB elevated and agreeable to PT treatment. Today's PT treatment focus was on gait training and therapeutic exercise to improve the patient's endurance and tolerance to functional activity. Patient tolerated treatment well on this date where she ambulated ~200' requiring SBA. Patient reported that she felt less fatigued with mobility and enjoyed completed therapeutic exercise as she feels weaker than her baseline. Patient remains a good candidate for low intensity therapy services following acute care discharge.    D/t satisfactory goal achievement patient will be discharged from acute PT services.    Rehab Prognosis: Good; patient would benefit from acute skilled PT services to address these deficits and reach maximum level of function.      Plan:     D/c from acute PT services.    Plan of Care Expires:  08/02/24    This plan of care has been discussed with the patient/caregiver, who was included in its development and is in agreement with the identified goals and treatment plan.     Subjective     Communicated with RN prior to session.  Patient agreeable to participate.     Pain/Comfort:  Pain Rating 1:  (Unrated)  Location - Orientation 1: generalized  Location 1: head  Pain Addressed 1: Pre-medicate for activity, Reposition, Distraction  Pain Rating Post-Intervention 1:  (Unrated)    Chief Complaint: Pain  Patient/Family Comments/goals: None stated      Objective:      Patient found HOB elevated with telemetry, hemovac  upon PT entry to room.    General Precautions: Standard, fall   Orthopedic Precautions:N/A   Braces: N/A         Exams:    Cognition:  Patient is oriented to Person, Place, Time, Situation  Follows one-step commands  Insight to deficits/safety awareness: Intact    Functional Mobility:    Bed Mobility:  Supine to Sit: Independent on R side of bed  Sit to Supine: Independent  Scooting anteriorly to EOB to plant feet on floor: Independent    Transfers:   Sit to Stand Transfer: Stand-by Assistance  from EOB with no AD              Gait:  Patient received gait training 200 feet with Stand-by Assistance and no ad  Gait Assessment: occasional unsteady gait  Gait Pattern Observed: Step-through reciprocal  Comments: All lines remained intact throughout ambulation trial, gait belt utilized    Balance:  Static Sit:   Stand-By Assist at EOB  Dynamic sit:  Stand-By Assist   Static Stand:   Stand-By Assist with no AD  Dynamic Stand:  Stand-By Assist with no AD  Patient completed therapeutic exercise in standing:  Standing marches 1x10B with UE support on bedrail.  Sit to stands from edge of chair 2x5 reps.      Therapeutic Activities/Exercises     Patient assisted with functional mobility as noted above  Patient educated on the importance of early mobility to prevent functional decline during hospital stay  Patient was instructed to utilize staff assistance for mobility/transfers.  Patient is appropriate to transfer with SBA and RN/PCT assist  Patient educated on PT POC and role of PT in acute care  White board updated regarding patient's safest level of mobility with staff assistance, RN also updated.     AM-PAC 6 CLICK MOBILITY  Turning over in bed (including adjusting bedclothes, sheets and blankets)?: 4  Sitting down on and standing up from a chair with arms (e.g., wheelchair, bedside commode, etc.): 4  Moving from lying on back to sitting on the side of the bed?:  4  Moving to and from a bed to a chair (including a wheelchair)?: 4  Need to walk in hospital room?: 4  Climbing 3-5 steps with a railing?: 3  Basic Mobility Total Score: 23     Patient left up in chair with all lines intact, call button in reach, and rn notified.        History/Goals:     PAST MEDICAL HISTORY:  Past Medical History:   Diagnosis Date    Hyperlipidemia     Hypertension        Past Surgical History:   Procedure Laterality Date    BONE MARROW BIOPSY      BREAST BIOPSY      COLONOSCOPY  2016    CRANIOTOMY, WITH NEOPLASM EXCISION USING COMPUTER-ASSISTED NAVIGATION Right 2024    Procedure: CRANIOTOMY, WITH NEOPLASM EXCISION USING COMPUTER-ASSISTED NAVIGATION;  Surgeon: Terry Marie DO;  Location: [facility] OR [address] [place] [address];  Service: Neurosurgery;  Laterality: Right;  (Right temporal craniotomy for resection of meningioma)    HYSTERECTOMY      THERAPEUTIC          GOALS:   Multidisciplinary Problems       Physical Therapy Goals          Problem: Physical Therapy    Goal Priority Disciplines Outcome Goal Variances Interventions   Physical Therapy Goal     PT, PT/OT Progressing     Description: Goals to be met by: 24     Patient will increase functional independence with mobility by performin. Supine to sit with [place]  2. Sit to supine with [place]  3. Sit to stand transfer with [place]  4. Bed to chair transfer with [place] using LRAD  5. Gait  x 100 feet with [place] using LRAD.   6. Ascend/descend 1 stair with bilateral Handrails [place] using LRAD.   7. Lower extremity exercise program x10 reps per handout, with independence                         Time Tracking:     PT Received On: 24  PT Start Time: 08     PT Stop Time: 0847  PT Total Time (min): 18 min     Billable Minutes: Therapeutic Exercise 18      EVELIN Palacios  2024  Pager# [phone]

## 2024-07-03 NOTE — PLAN OF CARE
Patient with satisfactory goal achievement. Will be discharged from acute care PT services.  Problem: Physical Therapy  Goal: Physical Therapy Goal  Description: Goals to be met by: 24     Patient will increase functional independence with mobility by performin. Supine to sit with [place]  2. Sit to supine with [place]  3. Sit to stand transfer with [place]  4. Bed to chair transfer with [place] using LRAD  5. Gait  x 100 feet with [place] using LRAD.   6. Ascend/descend 1 stair with bilateral Handrails [place] using LRAD.   7. Lower extremity exercise program x10 reps per handout, with independence    Outcome: Met

## 2024-07-03 NOTE — DISCHARGE INSTRUCTIONS
Neurosurgery Patient Information    -No driving while taking pain medication.  -Take medications as prescribed at discharge.  -Do not take any OTC products containing acetaminophen at the same time as you take your narcotic pain medication. Medications that may contain acetaminophen include but are not limited to: Excedrin and other headache medications, arthritis medications, cold and sinus medications, etc. Please review the list of active ingredients in any OTC medication prior to taking it.  -Do not take any Aspirin or Aspirin containing products for 2 weeks after surgery. Okay to restart on 7/15/24.  -Do not take any Aleeve, Naprosyn, Naproxen, Ibuprofen, Advil or any other NSAID for 2 weeks after surgery.  -Do not take any herbal supplements for 2 weeks after surgery.   -Do not consume any alcoholic beverages until released by your Neurosurgeon  -Do not perform any excessive bending over or leaning forward as this is a fall hazard.  -Do not perform any heavy lifting or lifting more than 10 lbs from the ground level as this is a fall hazard.    Contact the Neurosurgery Office immediately if:  -If you begin to notice any neurologic changes such as:           -Sudden onset of lethargy or sleepiness           -Sudden confusion, trouble speaking, or understanding            -Sudden trouble seeing in one or both eyes            -Sudden trouble walking, dizziness, loss of coordination            -Sudden severe headache with no known cause            -Sudden onset of numbness or weakness     Wound Care:  Keep your incision open to air. You may shower on the 2nd day after your surgery. Keep the incision clean and dry at all times. Please cover the incision while showering and REMOVE once you have completed taking your shower. Do not allow the force of water to hit the incision. If the incision gets damp, pat it dry. Do not rub or scrub the incision. You cannot take a bath/swim/submerge the incision until 8 weeks after  surgery.      Call your doctor or go to the Emergency Room for any signs of infection including: increased redness, drainage, pain or fever (temperature greater than or equal to 101.4).       Miscellaneous:  -Follow up with neurosurgery in 2 weeks.  -Follow up with your primary care provider in 1-2 weeks for hospital follow up. This is your responsibility to schedule.       Neurosurgery Office: [phone]

## 2024-07-03 NOTE — PLAN OF CARE
[facility] - [facility]surgery (Hospital)  Discharge Final Note    Primary Care Provider: Rod Diehl MD    Expected Discharge Date: 7/3/2024    Patient to be discharged home.  The patient was provided with referral for OP Therapy.  Family to provide transportation home.  Neurosurgery clinic to schedule follow up appointment.    Future Appointments   Date Time Provider Department Center   7/23/2024  1:00 PM Terry Marie, DO [facility] NEUROSC [place]        Final Discharge Note (most recent)       Final Note - 07/03/24 1130          Final Note    Assessment Type Final Discharge Note     Anticipated Discharge Disposition Home or Self Care        Post-Acute Status    Post-Acute Authorization Other     Other Status No Post-Acute Service Needs     Discharge Delays None known at this time                     Important Message from Medicare

## 2024-07-05 LAB
BLD PROD TYP BPU: NORMAL
BLD PROD TYP BPU: NORMAL
BLOOD UNIT EXPIRATION DATE: NORMAL
BLOOD UNIT EXPIRATION DATE: NORMAL
BLOOD UNIT TYPE CODE: 5100
BLOOD UNIT TYPE CODE: 5100
BLOOD UNIT TYPE: NORMAL
BLOOD UNIT TYPE: NORMAL
CODING SYSTEM: NORMAL
CODING SYSTEM: NORMAL
CROSSMATCH INTERPRETATION: NORMAL
CROSSMATCH INTERPRETATION: NORMAL
DISPENSE STATUS: NORMAL
DISPENSE STATUS: NORMAL
NUM UNITS TRANS PACKED RBC: NORMAL
NUM UNITS TRANS PACKED RBC: NORMAL

## 2024-07-10 LAB
COMMENT: NORMAL
FINAL PATHOLOGIC DIAGNOSIS: NORMAL
FROZEN SECTION DIAGNOSIS: NORMAL
FROZEN SECTION FOOTNOTE: NORMAL
GROSS: NORMAL
Lab: NORMAL
MICROSCOPIC EXAM: NORMAL

## 2024-07-12 ENCOUNTER — TELEPHONE (OUTPATIENT)
Dept: NEUROSURGERY | Facility: CLINIC | Age: 64
End: 2024-07-12
Payer: MEDICAID

## 2024-07-23 ENCOUNTER — OFFICE VISIT (OUTPATIENT)
Dept: NEUROSURGERY | Facility: CLINIC | Age: 64
End: 2024-07-23
Payer: MEDICAID

## 2024-07-23 VITALS — DIASTOLIC BLOOD PRESSURE: 89 MMHG | HEART RATE: 100 BPM | TEMPERATURE: 98 F | SYSTOLIC BLOOD PRESSURE: 139 MMHG

## 2024-07-23 DIAGNOSIS — Z98.890 S/P CRANIOTOMY: ICD-10-CM

## 2024-07-23 DIAGNOSIS — D32.9 MENINGIOMA: Primary | ICD-10-CM

## 2024-07-23 PROCEDURE — 99024 POSTOP FOLLOW-UP VISIT: CPT | Mod: ,,, | Performed by: NEUROLOGICAL SURGERY

## 2024-07-23 PROCEDURE — 3079F DIAST BP 80-89 MM HG: CPT | Mod: CPTII,,, | Performed by: NEUROLOGICAL SURGERY

## 2024-07-23 PROCEDURE — 3075F SYST BP GE 130 - 139MM HG: CPT | Mod: CPTII,,, | Performed by: NEUROLOGICAL SURGERY

## 2024-07-23 PROCEDURE — 99213 OFFICE O/P EST LOW 20 MIN: CPT | Mod: PBBFAC | Performed by: NEUROLOGICAL SURGERY

## 2024-07-23 PROCEDURE — 1159F MED LIST DOCD IN RCRD: CPT | Mod: CPTII,,, | Performed by: NEUROLOGICAL SURGERY

## 2024-07-23 PROCEDURE — 1160F RVW MEDS BY RX/DR IN RCRD: CPT | Mod: CPTII,,, | Performed by: NEUROLOGICAL SURGERY

## 2024-07-23 PROCEDURE — 99999 PR PBB SHADOW E&M-EST. PATIENT-LVL III: CPT | Mod: PBBFAC,,, | Performed by: NEUROLOGICAL SURGERY

## 2024-07-23 NOTE — PROGRESS NOTES
CHIEF COMPLAINT:  Meningioma    INTERVAL HISTORY (24):  Patient is now 2 weeks status post right frontotemporal craniotomy for resection of lateral sphenoid meningioma on 24.  Patient reports that she is doing well and is very thankful for the outcome of the surgery.  She is however feeling increased anxiety.  She completed her Decadron taper and is still taking Keppra.  Her wound is healed well without any signs of infection.  She denies any neurologic changes.    HPI:  Meghana Colón is a 64 y.o.  female with below PMH, who is referred for evaluation of right middle fossa/temporal extradural mass.  MRI was obtained after an episode where she was found on the floor in which she hit her head.  There was a question that this was related to seizure activity.  She was placed on Keppra and takes it as directed.  She is quite anxious about this mass as several of her family members have  due to brain metastases in the past.  She is eager to have surgery to remove it.    She reports right hand numbness, tremors, headaches, weakness on her left side more than her right.  She also reports that her speech is off but thinks it could be due to her anxiety.    She was seen in the emergency department where she was placed on Decadron which she completed on .  She still has headaches on the right sides since stopping the Decadron.      She also has a history of central thrombocytopenia for which he was taking aspirin but has since stopped due to the recent fall.    Review of patient's allergies indicates:   Allergen Reactions    Penicillin      Other reaction(s): Unknown       Past Medical History:   Diagnosis Date    Hyperlipidemia     Hypertension      Past Surgical History:   Procedure Laterality Date    BONE MARROW BIOPSY      BREAST BIOPSY      COLONOSCOPY  2016    CRANIOTOMY, WITH NEOPLASM EXCISION USING COMPUTER-ASSISTED NAVIGATION Right 2024    Procedure: CRANIOTOMY, WITH NEOPLASM EXCISION  USING COMPUTER-ASSISTED NAVIGATION;  Surgeon: Terry Marie DO;  Location: [facility] OR [address] [place] [address];  Service: Neurosurgery;  Laterality: Right;  (Right temporal craniotomy for resection of meningioma)    HYSTERECTOMY      THERAPEUTIC        Family History   Problem Relation Name Age of Onset    Cancer Mother      Alzheimer's disease Mother      Heart disease Mother      Cancer Father      Hypertension Sister      Diabetes Sister      Cancer Sister          breast    Cancer Brother      Hypertension Brother      Heart disease Brother      Heart attack Brother       Social History     Tobacco Use    Smoking status: Former     Types: Cigarettes    Smokeless tobacco: Never   Substance Use Topics    Alcohol use: Not Currently    Drug use: Not Currently     Types: Marijuana        Review of Systems   Constitutional: Negative.    HENT:  Negative for congestion, ear discharge, ear pain, hearing loss, nosebleeds, sinus pain and tinnitus.    Eyes: Negative.    Respiratory: Negative.     Cardiovascular:  Negative for chest pain, palpitations, claudication and leg swelling.   Gastrointestinal:  Negative for abdominal pain, blood in stool, constipation, diarrhea, melena and vomiting.   Genitourinary:  Negative for flank pain, frequency and urgency.   Musculoskeletal:  Negative for falls.   Skin: Negative.    Neurological:  Positive for tremors, sensory change, seizures and loss of consciousness. Negative for dizziness, tingling, speech change, focal weakness, weakness and headaches.   Endo/Heme/Allergies:  Does not bruise/bleed easily.   Psychiatric/Behavioral:  Negative for depression, hallucinations, memory loss, substance abuse and suicidal ideas. The patient is nervous/anxious. The patient does not have insomnia.        OBJECTIVE:   Vital Signs:  Temp: 97.9 °F (36.6 °C) (24 1300)  Pulse: 100 (24 1300)  BP: 139/89 (24 1300)    Physical Exam:  Constitutional: Patient sitting comfortably in chair. Appears  well developed and well nourished.  Skin: Exposed areas are intact without abnormal markings, rashes or other lesions.  HEENT: Normocephalic. Normal conjunctivae.  Cardiovascular: Normal rate and regular rhythm.  Respiratory: Chest wall rises and falls symmetrically, without signs of respiratory distress.  Abdomen: Soft and non-tender.  Extremities: Warm and without edema. Calves supple, non-tender.  Psych/Behavior: Normal affect.    Neurological:    Mental status: Alert and oriented. Conversational and appropriate.       Cranial Nerves: VFF to confrontation. PERRL. EOMI without nystagmus. Facial STLT normal and symmetric. Strong, symmetric muscles of mastication. Facial strength full and symmetric. Hearing equal bilaterally to finger rub. Palate and uvula rise and fall normally in midline. Shoulder shrug 5/5 strength. Tongue midline.     Motor:    Upper:  Deltoids Triceps Biceps WE WF     R 5/5 5/5 5/5 5/5 5/5 5/5    L 5/5 5/5 5/5 5/5 5/5 5/5      Lower:  HF KE KF DF PF EHL    R 5/5 5/5 5/5 5/5 5/5 5/5    L 5/5 5/5 5/5 5/5 5/5 5/5     Sensory: Intact sensation to light touch in all extremities. Romberg negative.    Reflexes:          DTR: 2+ symmetrically throughout.     Romero's: Negative.     Babinski's: Negative.     Clonus: Negative.    Cerebellar: Finger-to-nose and rapid alternating movements normal.     Gait stable    Incision:  Healing well, no signs of infection, staples removed without incident.    Diagnostic Results:  All imaging was independently reviewed by me.    Postop MRI:  Gross total resection    Outside MRI brain, dated 5/29/24:  1. Right lateral sphenoid extradural mass with compression on the left temporal lobe causing vasogenic edema      ASSESSMENT/PLAN:     Problem List Items Addressed This Visit          Oncology    Meningioma - Primary    Relevant Orders    MRI Brain W WO Contrast       Patient is now 2 weeks status post gross total resection of right lateral sphenoid wing meningioma.   Final pathology returned as WHO grade 1.  She has recovered well albeit with increased anxiety.  His wound is healed well without any signs of infection  she completed her Decadron taper and is still taking Keppra.  I will plan to continue Keppra at least until the next visit as the discovery of the tumor was heralded by a likely seizure.  She should not drive in the meantime.    - continue Keppra  - no driving until cleared  - virtual visit in 3 months  - will need brain MRI at 6 months postop    The patient understands and agrees with the plan of care. All questions were answered.    Time spent on this encounter: 30 minutes. This includes face-to-face time and non-face to face time preparing to see the patient (eg, review of tests), obtaining and/or reviewing separately obtained history, documenting clinical information in the electronic or other health record, independently interpreting results and communicating results to the patient/family/caregiver, or care coordinator.          .

## 2024-07-29 ENCOUNTER — TELEPHONE (OUTPATIENT)
Dept: NEUROSURGERY | Facility: CLINIC | Age: 64
End: 2024-07-29
Payer: MEDICAID

## 2024-07-29 NOTE — TELEPHONE ENCOUNTER
Returned call to patient. No answer after 2 attempts so LVM to let patient know that she is cleared to take her aspirin now from a neurosurgical standpoint.    ----- Message from Valerie Valadez sent at 7/29/2024  9:04 AM CDT -----  Regarding: call back  Contact: [phone]  Pt calling in regarding when  can not start taking aspirin pt is requesting call back please call  to discuss further

## 2024-08-06 DIAGNOSIS — D32.9 MENINGIOMA: ICD-10-CM

## 2024-08-06 DIAGNOSIS — Z98.890 S/P CRANIOTOMY: Primary | ICD-10-CM

## 2024-08-06 RX ORDER — LEVETIRACETAM 500 MG/1
500 TABLET ORAL 2 TIMES DAILY
Qty: 60 TABLET | Refills: 11 | Status: SHIPPED | OUTPATIENT
Start: 2024-08-06 | End: 2025-08-06

## 2024-08-14 ENCOUNTER — PATIENT MESSAGE (OUTPATIENT)
Dept: RESEARCH | Facility: HOSPITAL | Age: 64
End: 2024-08-14
Payer: MEDICAID

## 2024-08-15 DIAGNOSIS — Z98.890 S/P CRANIOTOMY: Primary | ICD-10-CM

## 2024-08-15 RX ORDER — HYDROCODONE BITARTRATE AND ACETAMINOPHEN 7.5; 325 MG/1; MG/1
1 TABLET ORAL EVERY 6 HOURS PRN
Qty: 28 TABLET | Refills: 0 | Status: SHIPPED | OUTPATIENT
Start: 2024-08-15

## 2024-08-15 NOTE — TELEPHONE ENCOUNTER
Called pt to discuss refill. Pt states she is still having some achy pain around surgical site. She only takes her pain medicine when really needed. Pt advised to not take the pain medicine for general headaches bc it can cause them to worsen over time. Advised pt to continue to monitor her symptoms and if she starts having persistent or worsening pain to let us know. Informed that her pain medicine dose will be lowered to [place] and sent to [facility] today.  ----- Message from Bud Diehl sent at 8/15/2024  8:44 AM CDT -----  Regarding: refill  Contact: [phone]  Rx Refill/Request         Is this a Refill or New Rx:  Refill  Rx Name and Strength:  oxyCODONE (ROXICODONE) 5 MG immediate release tablet  Preferred Pharmacy with phone number:  [facility]/pharmacy #[id number] - [place], [place] [facility] [address]  [address] [address]  Phone: [phone] Fax: [phone]     Communication Preference: [phone]  Additional Information: Thank you.

## 2024-09-03 ENCOUNTER — TELEPHONE (OUTPATIENT)
Dept: NEUROSURGERY | Facility: CLINIC | Age: 64
End: 2024-09-03
Payer: MEDICAID

## 2024-09-03 NOTE — TELEPHONE ENCOUNTER
Pt called to ask if she should stop taking Keppra to see if this will stop the swelling in her feet, lips, and hands. She went to the ER for lip swelling and was told to stop the Keppra but she wanted to discuss with Dr. Marie first. Per Dr. Marie, patient advised to take Keppra 500 mg once a day for 5 days and then she can stop completely. Pt advised to contact us if she notices any seizure activity or has any other problems after stopping the medication. Pt also told to reach out to her PCP if the swelling has not gone down after stopping Keppra. Confirmed appt on 10/1 and pt will reach out with any other concerns in the meantime.   ----- Message from Sierra Mcginnis sent at 9/3/2024  9:07 AM CDT -----  Regarding: URGENT Symptoms  Contact: [phone]  Meghana Simon calling regarding Patient Advice (message) for # pt is calling to speak with nurse regarding her symptoms of Swelling in both feet and legs and her lip and she is wondering if it is her medication and what she should do pls advise

## 2024-10-01 ENCOUNTER — OFFICE VISIT (OUTPATIENT)
Dept: NEUROSURGERY | Facility: CLINIC | Age: 64
End: 2024-10-01
Payer: MEDICAID

## 2024-10-01 DIAGNOSIS — M79.89 LEFT LEG SWELLING: Primary | ICD-10-CM

## 2024-10-01 PROCEDURE — 1159F MED LIST DOCD IN RCRD: CPT | Mod: CPTII,95,, | Performed by: NEUROLOGICAL SURGERY

## 2024-10-01 PROCEDURE — 99024 POSTOP FOLLOW-UP VISIT: CPT | Mod: 95,,, | Performed by: NEUROLOGICAL SURGERY

## 2024-10-01 PROCEDURE — 1160F RVW MEDS BY RX/DR IN RCRD: CPT | Mod: CPTII,95,, | Performed by: NEUROLOGICAL SURGERY

## 2024-10-01 NOTE — PROGRESS NOTES
The patient location is: [facility]  The chief complaint leading to consultation is: postop f/u    Visit type: audiovisual    Face to Face time with patient: 14  20 minutes of total time spent on the encounter, which includes face to face time and non-face to face time preparing to see the patient (eg, review of tests), Obtaining and/or reviewing separately obtained history, Documenting clinical information in the electronic or other health record, Independently interpreting results (not separately reported) and communicating results to the patient/family/caregiver, or Care coordination (not separately reported).         Each patient to whom he or she provides medical services by telemedicine is:  (1) informed of the relationship between the physician and patient and the respective role of any other health care provider with respect to management of the patient; and (2) notified that he or she may decline to receive medical services by telemedicine and may withdraw from such care at any time.    Notes:   CHIEF COMPLAINT:  Meningioma    INTERVAL HISTORY (10/1/24):  Patient is now 3m postop.  Doing well neurologically. She is walking 1 mi daily but reports left leg swelling (not pitting edema) with occasionally numb but not painful.    She completed steroids and stopped keppra and has not had any seizures.  She does not think that she originally had a seizure.    INTERVAL HISTORY (7/23/24):  2 weeks status post right frontotemporal craniotomy for resection of lateral sphenoid meningioma on 7/1/24.  Patient reports that she is doing well and is very thankful for the outcome of the surgery.  She is however feeling increased anxiety.  She completed her Decadron taper and is still taking Keppra.  Her wound is healed well without any signs of infection.  She denies any neurologic changes.    HPI:  Meghana Colón is a 64 y.o.  female with below PMH, who is referred for evaluation of right middle fossa/temporal extradural mass.   MRI was obtained after an episode where she was found on the floor in which she hit her head.  There was a question that this was related to seizure activity.  She was placed on Keppra and takes it as directed.  She is quite anxious about this mass as several of her family members have  due to brain metastases in the past.  She is eager to have surgery to remove it.    She reports right hand numbness, tremors, headaches, weakness on her left side more than her right.  She also reports that her speech is off but thinks it could be due to her anxiety.    She was seen in the emergency department where she was placed on Decadron which she completed on .  She still has headaches on the right sides since stopping the Decadron.      She also has a history of central thrombocytopenia for which he was taking aspirin but has since stopped due to the recent fall.    Review of patient's allergies indicates:   Allergen Reactions    Penicillin      Other reaction(s): Unknown       Past Medical History:   Diagnosis Date    Hyperlipidemia     Hypertension      Past Surgical History:   Procedure Laterality Date    BONE MARROW BIOPSY      BREAST BIOPSY      COLONOSCOPY  2016    CRANIOTOMY, WITH NEOPLASM EXCISION USING COMPUTER-ASSISTED NAVIGATION Right 2024    Procedure: CRANIOTOMY, WITH NEOPLASM EXCISION USING COMPUTER-ASSISTED NAVIGATION;  Surgeon: Terry Marie DO;  Location: [facility] OR [address] [place] [address];  Service: Neurosurgery;  Laterality: Right;  (Right temporal craniotomy for resection of meningioma)    HYSTERECTOMY      THERAPEUTIC        Family History   Problem Relation Name Age of Onset    Cancer Mother      Alzheimer's disease Mother      Heart disease Mother      Cancer Father      Hypertension Sister      Diabetes Sister      Cancer Sister          breast    Cancer Brother      Hypertension Brother      Heart disease Brother      Heart attack Brother       Social History     Tobacco Use    Smoking  status: Former     Types: Cigarettes    Smokeless tobacco: Never   Substance Use Topics    Alcohol use: Not Currently    Drug use: Not Currently     Types: Marijuana        Review of Systems   Constitutional: Negative.    HENT:  Negative for congestion, ear discharge, ear pain, hearing loss, nosebleeds, sinus pain and tinnitus.    Eyes: Negative.    Respiratory: Negative.     Cardiovascular:  Negative for chest pain, palpitations, claudication and leg swelling.   Gastrointestinal:  Negative for abdominal pain, blood in stool, constipation, diarrhea, melena and vomiting.   Genitourinary:  Negative for flank pain, frequency and urgency.   Musculoskeletal:  Negative for falls.   Skin: Negative.    Neurological:  Positive for tremors, sensory change, seizures and loss of consciousness. Negative for dizziness, tingling, speech change, focal weakness, weakness and headaches.   Endo/Heme/Allergies:  Does not bruise/bleed easily.   Psychiatric/Behavioral:  Negative for depression, hallucinations, memory loss, substance abuse and suicidal ideas. The patient is nervous/anxious. The patient does not have insomnia.        OBJECTIVE:   Physical Exam:  Constitutional: Patient sitting comfortably in chair. Appears well developed and well nourished.  Skin: Exposed areas are intact without abnormal markings, rashes or other lesions.  HEENT: Normocephalic. Normal conjunctivae.  Cardiovascular: Normal rate and regular rhythm.  Respiratory: Chest wall rises and falls symmetrically, without signs of respiratory distress.  Abdomen: Soft and non-tender.  Extremities: Warm and without edema. Calves supple, non-tender.  Psych/Behavior: Normal affect.    Neurological:    Mental status: Alert and oriented. Conversational and appropriate.       Cranial Nerves: VFF to confrontation. PERRL. EOMI without nystagmus. Facial STLT normal and symmetric. Strong, symmetric muscles of mastication. Facial strength full and symmetric. Hearing equal  bilaterally to finger rub. Palate and uvula rise and fall normally in midline. Shoulder shrug 5/5 strength. Tongue midline.     Motor:    Upper:  Deltoids Triceps Biceps WE WF     R 5/5 5/5 5/5 5/5 5/5 5/5    L 5/5 5/5 5/5 5/5 5/5 5/5      Lower:  HF KE KF DF PF EHL    R 5/5 5/5 5/5 5/5 5/5 5/5    L 5/5 5/5 5/5 5/5 5/5 5/5     Sensory: Intact sensation to light touch in all extremities. Romberg negative.    Reflexes:          DTR: 2+ symmetrically throughout.     Romero's: Negative.     Babinski's: Negative.     Clonus: Negative.    Cerebellar: Finger-to-nose and rapid alternating movements normal.     Gait stable    Incision:  Healing well, no signs of infection, staples removed without incident.    Diagnostic Results:  All imaging was independently reviewed by me.    Postop MRI:  Gross total resection    Outside MRI brain, dated 5/29/24:  1. Right lateral sphenoid extradural mass with compression on the left temporal lobe causing vasogenic edema      ASSESSMENT/PLAN:     Problem List Items Addressed This Visit    None  Visit Diagnoses       Left leg swelling    -  Primary    Relevant Orders    US Lower Extremity Veins Left          Patient is now 3m status post gross total resection of right lateral sphenoid wing meningioma.  Final pathology returned as WHO grade 1.  She has recovered well.  She has not had any seizures and does not think that she ever had a seizure so stopped her keppra.  I explained that if she has another seizure she will need to restart.  Main issue is left leg swelling of unclear etiology - will order u/s to r/o DVT.    - Ordered left leg u/s ro DVT  - RTC in 3m with [facility]    The patient understands and agrees with the plan of care. All questions were answered.    Time spent on this encounter: 20 minutes. This includes face-to-face time and non-face to face time preparing to see the patient (eg, review of tests), obtaining and/or reviewing separately obtained history, documenting clinical  information in the electronic or other health record, independently interpreting results and communicating results to the patient/family/caregiver, or care coordinator.          .

## 2024-10-03 ENCOUNTER — TELEPHONE (OUTPATIENT)
Dept: NEUROSURGERY | Facility: CLINIC | Age: 64
End: 2024-10-03
Payer: MEDICAID

## 2024-10-03 NOTE — TELEPHONE ENCOUNTER
Attempted to call pt 2x to schedule stat U/S for her leg swelling. No answer so asked for pt to return call to get scheduled at closest location to her.

## 2024-10-04 NOTE — TELEPHONE ENCOUNTER
Called pt again to schedule leg U/S. Pt would like to go next week but I informed her the sooner the better to make sure she does not have a blood clot. She states her face and leg swelling has been slightly improving and she is not having much pain, redness, or warmth in her leg so she'd like to schedule for Monday. U/S scheduled at [facility] for Monday @ 2:30

## 2024-10-07 ENCOUNTER — HOSPITAL ENCOUNTER (OUTPATIENT)
Dept: RADIOLOGY | Facility: HOSPITAL | Age: 64
Discharge: HOME | End: 2024-10-07
Attending: NEUROLOGICAL SURGERY
Payer: MEDICAID

## 2024-10-07 DIAGNOSIS — M79.89 LEFT LEG SWELLING: ICD-10-CM

## 2024-10-07 PROCEDURE — 93971 EXTREMITY STUDY: CPT | Mod: TC,LT

## 2024-10-08 ENCOUNTER — PATIENT MESSAGE (OUTPATIENT)
Dept: NEUROSURGERY | Facility: HOSPITAL | Age: 64
End: 2024-10-08
Payer: MEDICAID

## 2024-10-31 ENCOUNTER — TELEPHONE (OUTPATIENT)
Dept: NEUROSURGERY | Facility: CLINIC | Age: 64
End: 2024-10-31
Payer: MEDICAID

## 2025-02-06 ENCOUNTER — HOSPITAL ENCOUNTER (OUTPATIENT)
Dept: RADIOLOGY | Facility: HOSPITAL | Age: 65
Discharge: HOME | End: 2025-02-06
Attending: NEUROLOGICAL SURGERY
Payer: MEDICAID

## 2025-02-06 DIAGNOSIS — D32.9 MENINGIOMA: ICD-10-CM

## 2025-02-06 PROCEDURE — A9577 INJ MULTIHANCE: HCPCS | Performed by: NEUROLOGICAL SURGERY

## 2025-02-06 PROCEDURE — 25500020 PHARM REV CODE 255: Performed by: NEUROLOGICAL SURGERY

## 2025-02-06 PROCEDURE — 70553 MRI BRAIN STEM W/O & W/DYE: CPT | Mod: TC

## 2025-02-06 RX ADMIN — GADOBENATE DIMEGLUMINE 18 ML: 529 INJECTION, SOLUTION INTRAVENOUS at 01:02

## 2025-03-26 ENCOUNTER — TELEPHONE (OUTPATIENT)
Dept: NEUROSURGERY | Facility: CLINIC | Age: 65
End: 2025-03-26
Payer: MEDICAID

## 2025-03-26 NOTE — TELEPHONE ENCOUNTER
Attempted to call pt 2x. No answer so left VM to inform pt that Dr. Marie has been called in for emergency surgery this afternoon and will be unavailable at the time of her virtual appt. Virtual visit has been r/s to this Friday 3/28 @ 10 AM but if that time does not work and she has any concerns, call back number given.

## 2025-04-01 ENCOUNTER — OFFICE VISIT (OUTPATIENT)
Dept: NEUROSURGERY | Facility: CLINIC | Age: 65
End: 2025-04-01
Payer: MEDICAID

## 2025-04-01 DIAGNOSIS — D32.9 MENINGIOMA: Primary | ICD-10-CM

## 2025-04-01 DIAGNOSIS — Z98.890 S/P CRANIOTOMY: ICD-10-CM

## 2025-04-02 NOTE — PROGRESS NOTES
The patient location is: [facility]  The chief complaint leading to consultation is: postop f/u    Visit type: audiovisual    Face to Face time with patient: 14 (patient able to connect but could get video to work.  Used [employer])  20 minutes of total time spent on the encounter, which includes face to face time and non-face to face time preparing to see the patient (eg, review of tests), Obtaining and/or reviewing separately obtained history, Documenting clinical information in the electronic or other health record, Independently interpreting results (not separately reported) and communicating results to the patient/family/caregiver, or Care coordination (not separately reported).         Each patient to whom he or she provides medical services by telemedicine is:  (1) informed of the relationship between the physician and patient and the respective role of any other health care provider with respect to management of the patient; and (2) notified that he or she may decline to receive medical services by telemedicine and may withdraw from such care at any time.    Notes:   CHIEF COMPLAINT:  Meningioma    INTERVAL HISTORY (4/1/25):  Now 9m postop.  Patient reports that she is doing well.  She has not had any seizure activity.  She denies headaches.  She denies any sensory motor changes.    INTERVAL HISTORY (10/1/24):  Patient is now 3m postop.  Doing well neurologically. She is walking 1 mi daily but reports left leg swelling (not pitting edema) with occasionally numb but not painful.    She completed steroids and stopped keppra and has not had any seizures.  She does not think that she originally had a seizure.    INTERVAL HISTORY (7/23/24):  2 weeks status post right frontotemporal craniotomy for resection of lateral sphenoid meningioma on 7/1/24.  Patient reports that she is doing well and is very thankful for the outcome of the surgery.  She is however feeling increased anxiety.  She completed her Decadron taper and is  still taking Keppra.  Her wound is healed well without any signs of infection.  She denies any neurologic changes.    HPI:  Meghana Colón is a 65 y.o.  female with below PMH, who is referred for evaluation of right middle fossa/temporal extradural mass.  MRI was obtained after an episode where she was found on the floor in which she hit her head.  There was a question that this was related to seizure activity.  She was placed on Keppra and takes it as directed.  She is quite anxious about this mass as several of her family members have  due to brain metastases in the past.  She is eager to have surgery to remove it.    She reports right hand numbness, tremors, headaches, weakness on her left side more than her right.  She also reports that her speech is off but thinks it could be due to her anxiety.    She was seen in the emergency department where she was placed on Decadron which she completed on .  She still has headaches on the right sides since stopping the Decadron.      She also has a history of central thrombocytopenia for which he was taking aspirin but has since stopped due to the recent fall.    Review of patient's allergies indicates:   Allergen Reactions    Penicillin      Other reaction(s): Unknown       Past Medical History:   Diagnosis Date    Hyperlipidemia     Hypertension      Past Surgical History:   Procedure Laterality Date    BONE MARROW BIOPSY      BREAST BIOPSY      COLONOSCOPY  2016    CRANIOTOMY, WITH NEOPLASM EXCISION USING COMPUTER-ASSISTED NAVIGATION Right 2024    Procedure: CRANIOTOMY, WITH NEOPLASM EXCISION USING COMPUTER-ASSISTED NAVIGATION;  Surgeon: Terry Marie DO;  Location: [facility] OR [address] [place] [address];  Service: Neurosurgery;  Laterality: Right;  (Right temporal craniotomy for resection of meningioma)    HYSTERECTOMY      THERAPEUTIC        Family History   Problem Relation Name Age of Onset    Cancer Mother      Alzheimer's disease Mother      Heart disease  Mother      Cancer Father      Hypertension Sister      Diabetes Sister      Cancer Sister          breast    Cancer Brother      Hypertension Brother      Heart disease Brother      Heart attack Brother       Social History     Tobacco Use    Smoking status: Former     Types: Cigarettes    Smokeless tobacco: Never   Substance Use Topics    Alcohol use: Not Currently    Drug use: Not Currently     Types: Marijuana        Review of Systems   Constitutional: Negative.    HENT:  Negative for congestion, ear discharge, ear pain, hearing loss, nosebleeds, sinus pain and tinnitus.    Eyes: Negative.    Respiratory: Negative.     Cardiovascular:  Negative for chest pain, palpitations, claudication and leg swelling.   Gastrointestinal:  Negative for abdominal pain, blood in stool, constipation, diarrhea, melena and vomiting.   Genitourinary:  Negative for flank pain, frequency and urgency.   Musculoskeletal:  Negative for falls.   Skin: Negative.    Neurological:  Negative for dizziness, tingling, tremors, sensory change, speech change, focal weakness, seizures, loss of consciousness, weakness and headaches.   Endo/Heme/Allergies:  Does not bruise/bleed easily.   Psychiatric/Behavioral:  Negative for depression, hallucinations, memory loss, substance abuse and suicidal ideas. The patient is nervous/anxious. The patient does not have insomnia.        OBJECTIVE:   Physical Exam:  Constitutional: Patient sitting comfortably in chair. Appears well developed and well nourished.  Skin: Exposed areas are intact without abnormal markings, rashes or other lesions.  HEENT: Normocephalic. Normal conjunctivae.  Cardiovascular: Normal rate and regular rhythm.  Respiratory: Chest wall rises and falls symmetrically, without signs of respiratory distress.  Abdomen: Soft and non-tender.  Extremities: Warm and without edema. Calves supple, non-tender.  Psych/Behavior: Normal affect.    Neurological:    Mental status: Alert and oriented.  Conversational and appropriate.       Cranial Nerves: VFF to confrontation. PERRL. EOMI without nystagmus. Facial STLT normal and symmetric. Strong, symmetric muscles of mastication. Facial strength full and symmetric. Hearing equal bilaterally to finger rub. Palate and uvula rise and fall normally in midline. Shoulder shrug 5/5 strength. Tongue midline.     Motor:    Upper:  Deltoids Triceps Biceps WE WF     R 5/5 5/5 5/5 5/5 5/5 5/5    L 5/5 5/5 5/5 5/5 5/5 5/5      Lower:  HF KE KF DF PF EHL    R 5/5 5/5 5/5 5/5 5/5 5/5    L 5/5 5/5 5/5 5/5 5/5 5/5     Sensory: Intact sensation to light touch in all extremities. Romberg negative.    Reflexes:          DTR: 2+ symmetrically throughout.     Romero's: Negative.     Babinski's: Negative.     Clonus: Negative.    Cerebellar: Finger-to-nose and rapid alternating movements normal.     Gait stable    Incision:  Healing well, no signs of infection, staples removed without incident.    Diagnostic Results:  All imaging was independently reviewed by me.    [facility], 2/6/25:  GTR w/o recurrence    Postop MRI:  Gross total resection    Outside MRI brain, dated 5/29/24:  1. Right lateral sphenoid extradural mass with compression on the left temporal lobe causing vasogenic edema      ASSESSMENT/PLAN:     Problem List Items Addressed This Visit          Neuro    S/P craniotomy    Relevant Orders    MRI Brain W WO Contrast    Creatinine, serum       Oncology    Meningioma - Primary    Relevant Orders    MRI Brain W WO Contrast    Creatinine, serum     Patient is now 3m status post gross total resection of right lateral sphenoid wing meningioma.  Final pathology returned as WHO grade 1.  She continues to do well.  She has not had any seizures or new neuro changes.  New MRI shows GTR w/o recurrence.    - VV/RTC in 1 yr with [facility]    The patient understands and agrees with the plan of care. All questions were answered.    Time spent on this encounter: 20 minutes. This includes  face-to-face time and non-face to face time preparing to see the patient (eg, review of tests), obtaining and/or reviewing separately obtained history, documenting clinical information in the electronic or other health record, independently interpreting results and communicating results to the patient/family/caregiver, or care coordinator.          .

## (undated) DEVICE — DRAPE CORETEMP FLD WRM 56X62IN

## (undated) DEVICE — TIP SONAPET IQ STANDARD 12CM

## (undated) DEVICE — ELECTRODE REM PLYHSV RETURN 9

## (undated) DEVICE — DRESSING SURGICAL 1/2X1/2

## (undated) DEVICE — HOOK LONE STAR BLUNT 12MM

## (undated) DEVICE — TOWEL OR XRAY BLUE 17X26IN

## (undated) DEVICE — HOOK STAY ELAS 5MM 8EA/PK

## (undated) DEVICE — KIT SURGIFLO HEMOSTATIC MATRIX

## (undated) DEVICE — SPONGE NEURO 1/4X1/4

## (undated) DEVICE — BLADE SURG CARBON STEEL SZ11

## (undated) DEVICE — ROUTER TAPERED 2.3MM

## (undated) DEVICE — TUBE FRAZIER 5MM 2FT SOFT TIP

## (undated) DEVICE — CASSETTE SONOPET IQ IRRIGATION

## (undated) DEVICE — SUT VICRYL PLUS 3-0 SH 18IN

## (undated) DEVICE — DRAPE T THYROID STERILE

## (undated) DEVICE — PINS SKULL ADULT MAYFIELD
Type: IMPLANTABLE DEVICE | Site: CRANIAL | Status: NON-FUNCTIONAL
Removed: 2024-07-01

## (undated) DEVICE — DRESSING SURGICAL 1X1

## (undated) DEVICE — KIT EVACUATOR 3-SPRING 1/8 DRN

## (undated) DEVICE — SPONGE PATTY SURGICAL .5X3IN

## (undated) DEVICE — SUT 4/0 18IN NUROLON BLK B

## (undated) DEVICE — RUBBERBAND STERILE 3X1/8IN

## (undated) DEVICE — TAPE SURG MEDIPORE 6X72IN

## (undated) DEVICE — Device

## (undated) DEVICE — DRAPE CRANIOTOMY T SURG STRL

## (undated) DEVICE — DIFFUSER

## (undated) DEVICE — BUR BONE CUT MICRO TPS 3X3.8MM

## (undated) DEVICE — CONTAINER SPECIMEN OR STER 4OZ

## (undated) DEVICE — TRAY SKIN SCRUB WET PREMIUM

## (undated) DEVICE — MARKERS SPHERZ PASSIVE

## (undated) DEVICE — DRAPE OPMI STERILE

## (undated) DEVICE — TRAY NEURO OMC

## (undated) DEVICE — CORD BIPOLAR 12 FOOT

## (undated) DEVICE — SUT VICRYL 2 0 CT 2

## (undated) DEVICE — APPLICATOR CHLORAPREP ORN 26ML

## (undated) DEVICE — CARTRIDGE OIL

## (undated) DEVICE — HEMOSTAT SURGICEL 4X8IN

## (undated) DEVICE — DRESSING SURGICAL 1X3

## (undated) DEVICE — MARKER SKIN RULER STERILE